# Patient Record
Sex: FEMALE | Race: WHITE | NOT HISPANIC OR LATINO | URBAN - METROPOLITAN AREA
[De-identification: names, ages, dates, MRNs, and addresses within clinical notes are randomized per-mention and may not be internally consistent; named-entity substitution may affect disease eponyms.]

---
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
## 2025-01-01 ENCOUNTER — EMERGENCY (EMERGENCY)
Facility: HOSPITAL | Age: 20
LOS: 0 days | Discharge: HOME | End: 2025-01-01
Attending: EMERGENCY MEDICINE
Payer: MEDICAID

## 2025-01-01 VITALS
WEIGHT: 181.22 LBS | HEIGHT: 68 IN | SYSTOLIC BLOOD PRESSURE: 101 MMHG | TEMPERATURE: 98 F | RESPIRATION RATE: 18 BRPM | HEART RATE: 72 BPM | OXYGEN SATURATION: 99 % | DIASTOLIC BLOOD PRESSURE: 70 MMHG

## 2025-01-01 DIAGNOSIS — O99.891 OTHER SPECIFIED DISEASES AND CONDITIONS COMPLICATING PREGNANCY: ICD-10-CM

## 2025-01-01 DIAGNOSIS — Z88.0 ALLERGY STATUS TO PENICILLIN: ICD-10-CM

## 2025-01-01 DIAGNOSIS — M41.9 SCOLIOSIS, UNSPECIFIED: ICD-10-CM

## 2025-01-01 DIAGNOSIS — O21.9 VOMITING OF PREGNANCY, UNSPECIFIED: ICD-10-CM

## 2025-01-01 DIAGNOSIS — Z3A.10 10 WEEKS GESTATION OF PREGNANCY: ICD-10-CM

## 2025-01-01 DIAGNOSIS — M54.50 LOW BACK PAIN, UNSPECIFIED: ICD-10-CM

## 2025-01-01 DIAGNOSIS — R11.2 NAUSEA WITH VOMITING, UNSPECIFIED: ICD-10-CM

## 2025-01-01 LAB
APPEARANCE UR: CLEAR — SIGNIFICANT CHANGE UP
BILIRUB UR-MCNC: NEGATIVE — SIGNIFICANT CHANGE UP
COLOR SPEC: YELLOW — SIGNIFICANT CHANGE UP
DIFF PNL FLD: NEGATIVE — SIGNIFICANT CHANGE UP
GLUCOSE UR QL: NEGATIVE MG/DL — SIGNIFICANT CHANGE UP
KETONES UR-MCNC: 15 MG/DL
LEUKOCYTE ESTERASE UR-ACNC: NEGATIVE — SIGNIFICANT CHANGE UP
NITRITE UR-MCNC: NEGATIVE — SIGNIFICANT CHANGE UP
PH UR: 7 — SIGNIFICANT CHANGE UP (ref 5–8)
PROT UR-MCNC: NEGATIVE MG/DL — SIGNIFICANT CHANGE UP
SP GR SPEC: <1.005 — LOW (ref 1–1.03)
UROBILINOGEN FLD QL: 0.2 MG/DL — SIGNIFICANT CHANGE UP (ref 0.2–1)

## 2025-01-01 PROCEDURE — 81003 URINALYSIS AUTO W/O SCOPE: CPT

## 2025-01-01 PROCEDURE — 99284 EMERGENCY DEPT VISIT MOD MDM: CPT

## 2025-01-01 PROCEDURE — 99283 EMERGENCY DEPT VISIT LOW MDM: CPT

## 2025-01-01 RX ORDER — ONDANSETRON HYDROCHLORIDE 4 MG/1
4 TABLET, FILM COATED ORAL ONCE
Refills: 0 | Status: COMPLETED | OUTPATIENT
Start: 2025-01-01 | End: 2025-01-01

## 2025-01-01 RX ORDER — ACETAMINOPHEN 500MG 500 MG/1
650 TABLET, COATED ORAL ONCE
Refills: 0 | Status: COMPLETED | OUTPATIENT
Start: 2025-01-01 | End: 2025-01-01

## 2025-01-01 RX ORDER — ONDANSETRON HYDROCHLORIDE 4 MG/1
1 TABLET, FILM COATED ORAL
Qty: 1 | Refills: 0
Start: 2025-01-01 | End: 2025-01-03

## 2025-01-01 RX ADMIN — ACETAMINOPHEN 500MG 650 MILLIGRAM(S): 500 TABLET, COATED ORAL at 03:34

## 2025-01-01 RX ADMIN — ONDANSETRON HYDROCHLORIDE 4 MILLIGRAM(S): 4 TABLET, FILM COATED ORAL at 02:03

## 2025-01-01 NOTE — ED PROVIDER NOTE - OBJECTIVE STATEMENT
19Y F 19Y F, 10 weeks pregnant, with multiple recent ED visits for abdominal pain and vomiting, presenting with 1 day h/o emesis. Patient was seen multiple times last month with similar symptoms.  She continues to complain of multiple episodes of NBNB emesis stating that she had 6 episodes today.  She is able to take small sips of water but is not tolerating any solids.  She took 20 mg of Pepcid and 25 mg of Benadryl once this morning with no improvement.  She is also complaining of lower back pain intermittently.  She was seen by her OB yesterday and had a normal ultrasound.  This morning she went to urgent care where she tested negative for flu, RSV and COVID. Denies fever, abdominal pain, cough, congestion, vaginal spotting, dysuria, hematuria, diarrhea.  Vaccines up-to-date.

## 2025-01-01 NOTE — ED ADULT TRIAGE NOTE - CHIEF COMPLAINT QUOTE
pt states she has been vomiting for 24 hours, when she eats something she throws it up about 2 hours later.  pt states she took Pepcid today w/ no relief.

## 2025-01-01 NOTE — ED PROVIDER NOTE - CARE PROVIDER_API CALL
Leti Bhagat  Pediatrics  [address] [place] [id number]  Phone: [phone]  Fax: [phone]  Follow Up Time:     Ralf Evangelista  Obstetrics and Gynecology  [address] [place] [address]  Phone: [phone]  Fax: [phone]  Follow Up Time:

## 2025-01-01 NOTE — ED PROVIDER NOTE - PHYSICAL EXAMINATION
General: Awake, alert, NAD.  HEENT: NCAT, PERRL, EOMI, conjunctiva and sclera clear, TMs non-bulging, non-erythematous, no nasal congestion, moist mucous membranes, oropharynx without erythema or exudates, supple neck, no cervical lymphadenopathy.  RESP: CTAB, no wheezes, no increased work of breathing, no tachypnea, no retractions, no nasal flaring.  CVS: RRR, S1 S2, no extra heart sounds, no murmurs, cap refill <2 sec, 2+ peripheral pulses.  ABD: (+) BS, soft, NTND.  : No costovertebral angle tenderness.  Skin: Warm, dry, well-perfused, no rashes, no lesions.   Psych: Cooperative and appropriate.

## 2025-01-01 NOTE — ED PROVIDER NOTE - PATIENT PORTAL LINK FT
You can access the FollowMyHealth Patient Portal offered by Health system by registering at the following website: [email or web address]. By joining [email or web address]’s FollowMyHealth portal, you will also be able to view your health information using other applications (apps) compatible with our system.

## 2025-01-01 NOTE — ED PROVIDER NOTE - CLINICAL SUMMARY MEDICAL DECISION MAKING FREE TEXT BOX
Labs, EKG and imaging were ordered, where indicated.  Independent interpretation of any labs, EKG & imaging that was ordered was performed by me, Dr. Olivera. Appropriate medications for patient's presenting complaints were ordered and effects were reassessed, where indicated.  Patient's records (prior hospital, ED visit, and/or nursing home note) were reviewed, if available.  Additional history was obtained from EMS, family, and/or PCP (where available).  Escalation to admission/observation was considered.  However patient feels much better and patient/parent is comfortable with discharge.  Appropriate follow-up was arranged.     nausea/vomiting in pregnancy - zofran odt given, pt tolerated po, ate full tray of food while in ED, ua neg - all results d/w pt & copies given, strict return precautions discussed, rec outpt ObGyn f/u

## 2025-01-01 NOTE — ED PROVIDER NOTE - ATTENDING CONTRIBUTION TO CARE
19-year-old  female EGA 9.5 weeks (IUP previous confirmed at 5 weeks on ultrasound) PMH scoliosis presenting with nausea and vomiting x 3 days.  Patient reports nausea and vomiting throughout pregnancy thus far, has worsened over the last few days.  Also reporting bilateral low back pain, which she states she has had similar symptoms 2 in the past from her scoliosis.  Worse with movement, relieved by rest.  Patient states that she was seen in ED recently and diagnosed with a possible UTI, was prescribed antibiotics but did not take them.  Denies any urinary symptoms.  Denies F/C, lower abdominal pain/cramping, vaginal bleeding or discharge, flank pain.    PE:  adolsecent F nad  skin warm, dry  ncat  neck supple  rrr nl s1s2 no mrg  ctab no wrr  abd soft ntnd no palpable masses no rgr  back +bl paralumbar ttp no midline ttp, no cvat  ext no cce dpi  neuro aaox3 grossly nf exam

## 2025-01-04 LAB — FMR1 GENE MUT ANL BLD/T: NORMAL

## 2025-01-09 ENCOUNTER — NON-APPOINTMENT (OUTPATIENT)
Age: 20
End: 2025-01-09

## 2025-01-14 ENCOUNTER — APPOINTMENT (OUTPATIENT)
Dept: OBGYN | Facility: CLINIC | Age: 20
End: 2025-01-14
Payer: MEDICAID

## 2025-01-14 PROCEDURE — 0502F SUBSEQUENT PRENATAL CARE: CPT

## 2025-01-28 ENCOUNTER — ASOB RESULT (OUTPATIENT)
Age: 20
End: 2025-01-28

## 2025-01-28 ENCOUNTER — APPOINTMENT (OUTPATIENT)
Dept: ANTEPARTUM | Facility: CLINIC | Age: 20
End: 2025-01-28
Payer: MEDICAID

## 2025-01-28 VITALS — DIASTOLIC BLOOD PRESSURE: 69 MMHG | HEART RATE: 87 BPM | SYSTOLIC BLOOD PRESSURE: 120 MMHG

## 2025-01-28 VITALS
SYSTOLIC BLOOD PRESSURE: 130 MMHG | WEIGHT: 191 LBS | DIASTOLIC BLOOD PRESSURE: 72 MMHG | HEART RATE: 77 BPM | HEIGHT: 65 IN | BODY MASS INDEX: 31.82 KG/M2

## 2025-01-28 PROCEDURE — 36415 COLL VENOUS BLD VENIPUNCTURE: CPT

## 2025-01-28 PROCEDURE — 76813 OB US NUCHAL MEAS 1 GEST: CPT

## 2025-02-11 ENCOUNTER — NON-APPOINTMENT (OUTPATIENT)
Age: 20
End: 2025-02-11

## 2025-02-13 ENCOUNTER — NON-APPOINTMENT (OUTPATIENT)
Age: 20
End: 2025-02-13

## 2025-02-13 ENCOUNTER — APPOINTMENT (OUTPATIENT)
Dept: OBGYN | Facility: CLINIC | Age: 20
End: 2025-02-13
Payer: MEDICAID

## 2025-02-13 PROCEDURE — 0502F SUBSEQUENT PRENATAL CARE: CPT

## 2025-02-16 LAB
AFP INTERP SERPL-IMP: NORMAL
AFP INTERP SERPL-IMP: NORMAL
AFP MOM CUT-OFF: 2.5
AFP MOM SERPL: 1.65
AFP PERCENTILE: 94
AFP SERPL-ACNC: 45.33 NG/ML
CARBAMAZEPINE?: NO
CURRENT SMOKER: NORMAL
DIABETES STATUS PATIENT: NORMAL
GA: NORMAL
GESTATIONAL AGE METHOD: NORMAL
HX OF NTD NARR: NORMAL
MULTIPLE PREGNANCY: NORMAL
NEURAL TUBE DEFECT RISK FETUS: NORMAL
NEURAL TUBE DEFECT RISK POP: NORMAL
RECOM F/U: NO
TEST PERFORMANCE INFO SPEC: NORMAL
VALPROIC ACID?: NORMAL

## 2025-02-20 ENCOUNTER — NON-APPOINTMENT (OUTPATIENT)
Age: 20
End: 2025-02-20

## 2025-02-25 ENCOUNTER — NON-APPOINTMENT (OUTPATIENT)
Age: 20
End: 2025-02-25

## 2025-03-05 ENCOUNTER — NON-APPOINTMENT (OUTPATIENT)
Age: 20
End: 2025-03-05

## 2025-03-13 ENCOUNTER — APPOINTMENT (OUTPATIENT)
Dept: OBGYN | Facility: CLINIC | Age: 20
End: 2025-03-13
Payer: MEDICAID

## 2025-03-13 PROCEDURE — 0502F SUBSEQUENT PRENATAL CARE: CPT

## 2025-03-20 ENCOUNTER — ASOB RESULT (OUTPATIENT)
Age: 20
End: 2025-03-20

## 2025-03-20 ENCOUNTER — APPOINTMENT (OUTPATIENT)
Dept: ANTEPARTUM | Facility: CLINIC | Age: 20
End: 2025-03-20
Payer: MEDICAID

## 2025-03-20 ENCOUNTER — APPOINTMENT (OUTPATIENT)
Dept: MATERNAL FETAL MEDICINE | Facility: CLINIC | Age: 20
End: 2025-03-20
Payer: MEDICAID

## 2025-03-20 VITALS
WEIGHT: 212 LBS | HEART RATE: 107 BPM | SYSTOLIC BLOOD PRESSURE: 125 MMHG | DIASTOLIC BLOOD PRESSURE: 77 MMHG | HEIGHT: 65 IN | BODY MASS INDEX: 35.32 KG/M2

## 2025-03-20 DIAGNOSIS — S80.02XA CONTUSION OF LEFT KNEE, INITIAL ENCOUNTER: ICD-10-CM

## 2025-03-20 DIAGNOSIS — S93.402D SPRAIN OF UNSPECIFIED LIGAMENT OF LEFT ANKLE, SUBSEQUENT ENCOUNTER: ICD-10-CM

## 2025-03-20 DIAGNOSIS — Z71.9 COUNSELING, UNSPECIFIED: ICD-10-CM

## 2025-03-20 DIAGNOSIS — Z98.890 OTHER SPECIFIED POSTPROCEDURAL STATES: ICD-10-CM

## 2025-03-20 DIAGNOSIS — S61.250A: ICD-10-CM

## 2025-03-20 DIAGNOSIS — Z48.00 ENCOUNTER FOR CHANGE OR REMOVAL OF NONSURGICAL WOUND DRESSING: ICD-10-CM
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
## 2025-03-20 DIAGNOSIS — M25.562 PAIN IN LEFT KNEE: ICD-10-CM

## 2025-03-20 DIAGNOSIS — R45.4 IRRITABILITY AND ANGER: ICD-10-CM

## 2025-03-20 DIAGNOSIS — M89.9 DISORDER OF BONE, UNSPECIFIED: ICD-10-CM

## 2025-03-20 DIAGNOSIS — Z71.3 DIETARY COUNSELING AND SURVEILLANCE: ICD-10-CM

## 2025-03-20 DIAGNOSIS — Q67.5 CONGENITAL DEFORMITY OF SPINE: ICD-10-CM

## 2025-03-20 DIAGNOSIS — S93.402A SPRAIN OF UNSPECIFIED LIGAMENT OF LEFT ANKLE, INITIAL ENCOUNTER: ICD-10-CM

## 2025-03-20 DIAGNOSIS — T73.0XXA STARVATION, INITIAL ENCOUNTER: ICD-10-CM

## 2025-03-20 DIAGNOSIS — M79.642 PAIN IN LEFT HAND: ICD-10-CM

## 2025-03-20 DIAGNOSIS — Z34.90 ENCOUNTER FOR SUPERVISION OF NORMAL PREGNANCY, UNSPECIFIED, UNSPECIFIED TRIMESTER: ICD-10-CM

## 2025-03-20 DIAGNOSIS — Z87.42 PERSONAL HISTORY OF OTHER DISEASES OF THE FEMALE GENITAL TRACT: ICD-10-CM

## 2025-03-20 DIAGNOSIS — M54.2 CERVICALGIA: ICD-10-CM

## 2025-03-20 DIAGNOSIS — Z87.2 OTHER SPECIFIED POSTPROCEDURAL STATES: ICD-10-CM

## 2025-03-20 DIAGNOSIS — S69.91XA UNSPECIFIED INJURY OF RIGHT WRIST, HAND AND FINGER(S), INITIAL ENCOUNTER: ICD-10-CM

## 2025-03-20 DIAGNOSIS — Z71.89 OTHER SPECIFIED COUNSELING: ICD-10-CM

## 2025-03-20 DIAGNOSIS — Z87.09 PERSONAL HISTORY OF OTHER DISEASES OF THE RESPIRATORY SYSTEM: ICD-10-CM

## 2025-03-20 DIAGNOSIS — Z91.09 OTHER ALLERGY STATUS, OTHER THAN TO DRUGS AND BIOLOGICAL SUBSTANCES: ICD-10-CM

## 2025-03-20 DIAGNOSIS — M53.3 SACROCOCCYGEAL DISORDERS, NOT ELSEWHERE CLASSIFIED: ICD-10-CM

## 2025-03-20 DIAGNOSIS — Z00.129 ENCOUNTER FOR ROUTINE CHILD HEALTH EXAMINATION W/OUT ABNORMAL FINDINGS: ICD-10-CM
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
## 2025-03-20 DIAGNOSIS — Z23 ENCOUNTER FOR IMMUNIZATION: ICD-10-CM

## 2025-03-20 DIAGNOSIS — Z3A.21 21 WEEKS GESTATION OF PREGNANCY: ICD-10-CM

## 2025-03-20 PROCEDURE — 99215 OFFICE O/P EST HI 40 MIN: CPT | Mod: 25

## 2025-03-20 PROCEDURE — 76817 TRANSVAGINAL US OBSTETRIC: CPT

## 2025-03-20 PROCEDURE — 76805 OB US >/= 14 WKS SNGL FETUS: CPT

## 2025-04-02 ENCOUNTER — ASOB RESULT (OUTPATIENT)
Age: 20
End: 2025-04-02

## 2025-04-02 ENCOUNTER — APPOINTMENT (OUTPATIENT)
Dept: ANTEPARTUM | Facility: CLINIC | Age: 20
End: 2025-04-02
Payer: MEDICAID

## 2025-04-02 VITALS
SYSTOLIC BLOOD PRESSURE: 127 MMHG | DIASTOLIC BLOOD PRESSURE: 80 MMHG | HEIGHT: 65 IN | BODY MASS INDEX: 36.32 KG/M2 | WEIGHT: 218 LBS | HEART RATE: 106 BPM

## 2025-04-02 PROCEDURE — 76816 OB US FOLLOW-UP PER FETUS: CPT

## 2025-04-03 ENCOUNTER — NON-APPOINTMENT (OUTPATIENT)
Age: 20
End: 2025-04-03

## 2025-04-03 ENCOUNTER — EMERGENCY (EMERGENCY)
Facility: HOSPITAL | Age: 20
LOS: 0 days | Discharge: HOME | End: 2025-04-03
Attending: EMERGENCY MEDICINE
Payer: MEDICAID

## 2025-04-03 ENCOUNTER — OUTPATIENT (OUTPATIENT)
Dept: INPATIENT UNIT | Facility: HOSPITAL | Age: 20
LOS: 1 days | Discharge: HOME | End: 2025-04-03
Payer: MEDICAID

## 2025-04-03 VITALS
HEART RATE: 93 BPM | TEMPERATURE: 98 F | SYSTOLIC BLOOD PRESSURE: 132 MMHG | RESPIRATION RATE: 20 BRPM | DIASTOLIC BLOOD PRESSURE: 71 MMHG

## 2025-04-03 VITALS
DIASTOLIC BLOOD PRESSURE: 74 MMHG | OXYGEN SATURATION: 98 % | RESPIRATION RATE: 18 BRPM | TEMPERATURE: 98 F | HEART RATE: 105 BPM | SYSTOLIC BLOOD PRESSURE: 116 MMHG

## 2025-04-03 VITALS — HEART RATE: 93 BPM | DIASTOLIC BLOOD PRESSURE: 71 MMHG | SYSTOLIC BLOOD PRESSURE: 132 MMHG

## 2025-04-03 DIAGNOSIS — O26.899 OTHER SPECIFIED PREGNANCY RELATED CONDITIONS, UNSPECIFIED TRIMESTER: ICD-10-CM

## 2025-04-03 LAB
APPEARANCE UR: ABNORMAL
BILIRUB UR-MCNC: NEGATIVE — SIGNIFICANT CHANGE UP
COLOR SPEC: YELLOW — SIGNIFICANT CHANGE UP
DIFF PNL FLD: NEGATIVE — SIGNIFICANT CHANGE UP
GLUCOSE UR QL: NEGATIVE MG/DL — SIGNIFICANT CHANGE UP
KETONES UR-MCNC: NEGATIVE MG/DL — SIGNIFICANT CHANGE UP
LEUKOCYTE ESTERASE UR-ACNC: ABNORMAL
NITRITE UR-MCNC: NEGATIVE — SIGNIFICANT CHANGE UP
PH UR: 6 — SIGNIFICANT CHANGE UP (ref 5–8)
PROT UR-MCNC: SIGNIFICANT CHANGE UP MG/DL
SP GR SPEC: 1.02 — SIGNIFICANT CHANGE UP (ref 1–1.03)
UROBILINOGEN FLD QL: 0.2 MG/DL — SIGNIFICANT CHANGE UP (ref 0.2–1)

## 2025-04-03 PROCEDURE — 99214 OFFICE O/P EST MOD 30 MIN: CPT

## 2025-04-03 PROCEDURE — L9996: CPT

## 2025-04-03 PROCEDURE — 87086 URINE CULTURE/COLONY COUNT: CPT

## 2025-04-03 PROCEDURE — 81001 URINALYSIS AUTO W/SCOPE: CPT

## 2025-04-03 NOTE — OB PROVIDER TRIAGE NOTE - HISTORY OF PRESENT ILLNESS
21yo , at 23wd, [place] 25, dated by 1st trimester sono presents to L&D 1 day after anatomy scan for abdominal pain. Pt reports her sonographer was rough and feels the abdomen has been tender approx 2-3 inches to the left of the umbilicus since that time. She denies diffuse or pelvic pain. Reports pain feels like a sore muscle in the region and does not radiate. She also reports decreased fetal movement since that time. She also reports a recent hx of WBCs on a urine analysis she had done at urgent care today. Pt denies lof, vb, and ctx. Denies dysuria, back pain, SOB, chest pain and n/v. GBS unknown.     Hx of well controlled asthma, not on medications at this time    Last PO: 1000  Last BM:1hr ago  Last intercourse: >2 weeks ago

## 2025-04-03 NOTE — OB RN TRIAGE NOTE - NSSDOHPHYHURT_OBGYN_A_OB
Procedure:  DILATATION AND CURETTAGE (D&C) (N/A Uterus)  LEEP CONE BIOPSY (N/A Cervix)    Relevant Problems   NEURO/PSYCH   (+) Anxiety   (+) History of COVID-19 (2/08/2022)        Physical Exam    Airway    Mallampati score: II  TM Distance: >3 FB  Neck ROM: full     Dental   upper dentures and lower dentures,     Cardiovascular  Rhythm: regular, Rate: normal,     Pulmonary  Breath sounds clear to auscultation,     Other Findings        Anesthesia Plan  ASA Score- 2     Anesthesia Type- IV sedation with anesthesia with ASA Monitors  Additional Monitors:   Airway Plan:           Plan Factors-Exercise tolerance (METS): >4 METS  Chart reviewed  Existing labs reviewed  Patient is a current smoker  Patient instructed to abstain from smoking on day of procedure  Patient did not smoke on day of surgery  Obstructive sleep apnea risk education given perioperatively  Induction- intravenous  Postoperative Plan-     Informed Consent- Anesthetic plan and risks discussed with patient 
never

## 2025-04-03 NOTE — OB PROVIDER TRIAGE NOTE - NSHPLABSRESULTS_GEN_ALL_CORE
12/26  Hep B NR  Rubella immune  Measles immune  Varicella immune  RPR NR  Hep C NR  A POS, antibody neg  HIV NR    sonos:  13w5d: NT 1.9mm, nl ovaries bl   21w0d: nl anatomy, cervical length 4.16cm, MVP 4.63cm, posterior placenta

## 2025-04-03 NOTE — ED PEDIATRIC TRIAGE NOTE - NS ED NURSE NOTE DISPO AOU
Quality 110: Preventive Care And Screening: Influenza Immunization: Influenza Immunization Ordered or Recommended, but not Administered due to system reason Quality 47: Advance Care Plan: Advance Care Planning discussed and documented; advance care plan or surrogate decision maker documented in the medical record. Quality 111:Pneumonia Vaccination Status For Older Adults: Pneumococcal Vaccination not Administered or Previously Received, Reason not Otherwise Specified Detail Level: Detailed AOU-L&D

## 2025-04-03 NOTE — OB PROVIDER TRIAGE NOTE - NSHPPHYSICALEXAM_GEN_ALL_CORE
Physical Exam  Vital Signs Last 24 Hrs  T(F): 97.9 (03 Apr 2025 18:16), Max: 98.4 (03 Apr 2025 16:41)  HR: 93 (03 Apr 2025 18:17) (93 - 105)  BP: 132/71 (03 Apr 2025 18:17) (116/74 - 132/71)  RR: 20 (03 Apr 2025 18:16) (18 - 20)    Gen: NAD, AOx3  Abdomen: soft, gravid, nontender, no palpable contractions    Pasatiempo: none  SPEC: no blood, abnormal discharge, cervical os appears closed   BSS: FHR 142bpm, gross fetal movement, MVP 6.09cm  TVUS: 4.43cm cervical length

## 2025-04-03 NOTE — OB PROVIDER TRIAGE NOTE - NSOBPROVIDERNOTE_OBGYN_ALL_OB_FT
20y  @ 23w0d, GBS unknown, with reassuring fetal and maternal status.  -Discharged Home  -Labor precautions reviewed  -PO Hydration encouraged  -Reviewed fetal kick counts  -Follow up with scheduled OB visit on 25  -Patient verbalized understanding    Discussed with Dr. Zurita and Dr. Evangelista

## 2025-04-05 LAB
CULTURE RESULTS: SIGNIFICANT CHANGE UP
SPECIMEN SOURCE: SIGNIFICANT CHANGE UP

## 2025-04-07 DIAGNOSIS — O26.892 OTHER SPECIFIED PREGNANCY RELATED CONDITIONS, SECOND TRIMESTER: ICD-10-CM

## 2025-04-07 DIAGNOSIS — O99.512 DISEASES OF THE RESPIRATORY SYSTEM COMPLICATING PREGNANCY, SECOND TRIMESTER: ICD-10-CM

## 2025-04-07 DIAGNOSIS — Z3A.23 23 WEEKS GESTATION OF PREGNANCY: ICD-10-CM

## 2025-04-07 DIAGNOSIS — J45.909 UNSPECIFIED ASTHMA, UNCOMPLICATED: ICD-10-CM

## 2025-04-07 DIAGNOSIS — O36.8120 DECREASED FETAL MOVEMENTS, SECOND TRIMESTER, NOT APPLICABLE OR UNSPECIFIED: ICD-10-CM

## 2025-04-08 ENCOUNTER — APPOINTMENT (OUTPATIENT)
Dept: OBGYN | Facility: CLINIC | Age: 20
End: 2025-04-08
Payer: MEDICAID

## 2025-04-08 PROCEDURE — 0502F SUBSEQUENT PRENATAL CARE: CPT

## 2025-05-06 ENCOUNTER — APPOINTMENT (OUTPATIENT)
Dept: OBGYN | Facility: CLINIC | Age: 20
End: 2025-05-06
Payer: MEDICAID

## 2025-05-06 PROCEDURE — 0502F SUBSEQUENT PRENATAL CARE: CPT

## 2025-05-08 ENCOUNTER — LABORATORY RESULT (OUTPATIENT)
Age: 20
End: 2025-05-08

## 2025-05-09 LAB
BASOPHILS # BLD AUTO: 0.2 K/UL
BASOPHILS NFR BLD AUTO: 1 %
EOSINOPHIL # BLD AUTO: 1.18 K/UL
EOSINOPHIL NFR BLD AUTO: 6 %
GLUCOSE 1H P 50 G GLC PO SERPL-MCNC: 142 MG/DL
HCT VFR BLD CALC: 30.4 %
HGB BLD-MCNC: 9 G/DL
LYMPHOCYTES # BLD AUTO: 3.75 K/UL
LYMPHOCYTES NFR BLD AUTO: 19 %
MAN DIFF?: NORMAL
MCHC RBC-ENTMCNC: 25 PG
MCHC RBC-ENTMCNC: 29.6 G/DL
MCV RBC AUTO: 84.4 FL
MONOCYTES # BLD AUTO: 1.38 K/UL
MONOCYTES NFR BLD AUTO: 7 %
NEUTROPHILS # BLD AUTO: 12.23 K/UL
NEUTROPHILS NFR BLD AUTO: 56 %
PLATELET # BLD AUTO: 348 K/UL
PMV BLD: 10.2 FL
RBC # BLD: 3.6 M/UL
RBC # FLD: 14.6 %
WBC # FLD AUTO: 19.72 K/UL

## 2025-05-19 ENCOUNTER — NON-APPOINTMENT (OUTPATIENT)
Age: 20
End: 2025-05-19

## 2025-05-22 PROBLEM — O24.419 GESTATIONAL DIABETES: Status: ACTIVE | Noted: 2025-05-22

## 2025-05-22 RX ORDER — BLOOD-GLUCOSE METER
W/DEVICE KIT MISCELLANEOUS
Qty: 1 | Refills: 0 | Status: ACTIVE | COMMUNITY
Start: 2025-05-22 | End: 1900-01-01

## 2025-05-22 RX ORDER — BLOOD-GLUCOSE METER
KIT MISCELLANEOUS 4 TIMES DAILY
Qty: 1 | Refills: 2 | Status: ACTIVE | COMMUNITY
Start: 2025-05-22 | End: 1900-01-01

## 2025-05-22 RX ORDER — LANCETS
EACH MISCELLANEOUS
Qty: 1 | Refills: 2 | Status: ACTIVE | COMMUNITY
Start: 2025-05-22 | End: 1900-01-01

## 2025-05-27 DIAGNOSIS — Z3A.21 21 WEEKS GESTATION OF PREGNANCY: ICD-10-CM

## 2025-05-29 ENCOUNTER — ASOB RESULT (OUTPATIENT)
Age: 20
End: 2025-05-29

## 2025-05-29 ENCOUNTER — APPOINTMENT (OUTPATIENT)
Dept: MATERNAL FETAL MEDICINE | Facility: CLINIC | Age: 20
End: 2025-05-29
Payer: MEDICAID

## 2025-05-29 ENCOUNTER — APPOINTMENT (OUTPATIENT)
Dept: ANTEPARTUM | Facility: CLINIC | Age: 20
End: 2025-05-29
Payer: MEDICAID

## 2025-05-29 ENCOUNTER — NON-APPOINTMENT (OUTPATIENT)
Age: 20
End: 2025-05-29

## 2025-05-29 VITALS
SYSTOLIC BLOOD PRESSURE: 113 MMHG | HEART RATE: 94 BPM | BODY MASS INDEX: 39.99 KG/M2 | DIASTOLIC BLOOD PRESSURE: 74 MMHG | HEIGHT: 65 IN | WEIGHT: 240 LBS

## 2025-05-29 DIAGNOSIS — Z71.9 COUNSELING, UNSPECIFIED: ICD-10-CM

## 2025-05-29 DIAGNOSIS — Z3A.22 22 WEEKS GESTATION OF PREGNANCY: ICD-10-CM

## 2025-05-29 DIAGNOSIS — Z87.09 PERSONAL HISTORY OF OTHER DISEASES OF THE RESPIRATORY SYSTEM: ICD-10-CM

## 2025-05-29 DIAGNOSIS — Z71.3 DIETARY COUNSELING AND SURVEILLANCE: ICD-10-CM

## 2025-05-29 PROBLEM — O36.60X0 EXCESSIVE FETAL GROWTH AFFECTING MANAGEMENT OF PREGNANCY, ANTEPARTUM, SINGLE OR UNSPECIFIED FETUS: Status: ACTIVE | Noted: 2025-05-29

## 2025-05-29 PROBLEM — Z3A.31 31 WEEKS GESTATION OF PREGNANCY: Status: ACTIVE | Noted: 2025-05-29

## 2025-05-29 PROBLEM — O99.019 ANEMIA AFFECTING PREGNANCY, ANTEPARTUM: Status: ACTIVE | Noted: 2025-05-27

## 2025-05-29 PROCEDURE — 99211 OFF/OP EST MAY X REQ PHY/QHP: CPT | Mod: 25

## 2025-05-29 PROCEDURE — 99215 OFFICE O/P EST HI 40 MIN: CPT | Mod: 25

## 2025-05-29 PROCEDURE — 76816 OB US FOLLOW-UP PER FETUS: CPT

## 2025-05-29 PROCEDURE — 76819 FETAL BIOPHYS PROFIL W/O NST: CPT | Mod: 59

## 2025-06-03 ENCOUNTER — EMERGENCY (EMERGENCY)
Facility: HOSPITAL | Age: 20
LOS: 0 days | Discharge: LEFT BEFORE DISCHARGE | End: 2025-06-03
Payer: MEDICAID

## 2025-06-03 ENCOUNTER — OUTPATIENT (OUTPATIENT)
Dept: INPATIENT UNIT | Facility: HOSPITAL | Age: 20
LOS: 1 days | Discharge: HOME | End: 2025-06-03
Payer: MEDICAID

## 2025-06-03 VITALS — DIASTOLIC BLOOD PRESSURE: 69 MMHG | SYSTOLIC BLOOD PRESSURE: 131 MMHG | HEART RATE: 96 BPM

## 2025-06-03 VITALS
HEART RATE: 96 BPM | DIASTOLIC BLOOD PRESSURE: 69 MMHG | TEMPERATURE: 99 F | RESPIRATION RATE: 18 BRPM | SYSTOLIC BLOOD PRESSURE: 131 MMHG

## 2025-06-03 VITALS
RESPIRATION RATE: 18 BRPM | SYSTOLIC BLOOD PRESSURE: 116 MMHG | HEART RATE: 110 BPM | OXYGEN SATURATION: 97 % | TEMPERATURE: 98 F | DIASTOLIC BLOOD PRESSURE: 71 MMHG

## 2025-06-03 DIAGNOSIS — Z3A.31 31 WEEKS GESTATION OF PREGNANCY: ICD-10-CM

## 2025-06-03 DIAGNOSIS — R10.2 PELVIC AND PERINEAL PAIN: ICD-10-CM

## 2025-06-03 DIAGNOSIS — O99.891 OTHER SPECIFIED DISEASES AND CONDITIONS COMPLICATING PREGNANCY: ICD-10-CM

## 2025-06-03 DIAGNOSIS — O26.899 OTHER SPECIFIED PREGNANCY RELATED CONDITIONS, UNSPECIFIED TRIMESTER: ICD-10-CM

## 2025-06-03 DIAGNOSIS — Z53.21 PROCEDURE AND TREATMENT NOT CARRIED OUT DUE TO PATIENT LEAVING PRIOR TO BEING SEEN BY HEALTH CARE PROVIDER: ICD-10-CM

## 2025-06-03 LAB — GLUCOSE BLDC GLUCOMTR-MCNC: 110 MG/DL — HIGH (ref 70–99)

## 2025-06-03 PROCEDURE — 99223 1ST HOSP IP/OBS HIGH 75: CPT | Mod: 25

## 2025-06-03 PROCEDURE — 59025 FETAL NON-STRESS TEST: CPT | Mod: 26

## 2025-06-03 PROCEDURE — 59025 FETAL NON-STRESS TEST: CPT

## 2025-06-03 PROCEDURE — L9996: CPT

## 2025-06-03 PROCEDURE — 82962 GLUCOSE BLOOD TEST: CPT

## 2025-06-03 PROCEDURE — 99214 OFFICE O/P EST MOD 30 MIN: CPT

## 2025-06-03 NOTE — OB PROVIDER TRIAGE NOTE - NSHPPHYSICALEXAM_GEN_ALL_CORE
T(C): 37.3 (06-03-25 @ 16:46), Max: 37.3 (06-03-25 @ 16:46)  HR: 96 (06-03-25 @ 16:50) (96 - 110)  BP: 131/69 (06-03-25 @ 16:50) (116/71 - 131/69)  RR: 18 (06-03-25 @ 16:46) (18 - 18)  SpO2: 97% (06-03-25 @ 16:21) (97% - 97%)    Abd: gravid, soft, NT  VE: 0/0/-3  Ctx: none noted  FHR: 135 moderate variability, Reactive NST T(C): 37.3 (06-03-25 @ 16:46), Max: 37.3 (06-03-25 @ 16:46)  HR: 96 (06-03-25 @ 16:50) (96 - 110)  BP: 131/69 (06-03-25 @ 16:50) (116/71 - 131/69)  RR: 18 (06-03-25 @ 16:46) (18 - 18)  SpO2: 97% (06-03-25 @ 16:21) (97% - 97%)    Abd: gravid, soft, NT  Heart: Nl S1S2 RRR  Lungs: CTAB neg wheeze  VE: 0/0/-3  Ctx: none noted  FHR: 135 moderate variability, Reactive NST

## 2025-06-03 NOTE — OB PROVIDER TRIAGE NOTE - ADDITIONAL INSTRUCTIONS
If you experience more than 4 contractions in 20 minutes, Leaking of fluid or vaginal bleeding, call your doctor/ return to the hospital  Increase your fluid intake  You may take Tylenol as directed for pain  Followup with scheduled appointment

## 2025-06-03 NOTE — OB PROVIDER TRIAGE NOTE - HISTORY OF PRESENT ILLNESS
Pt is a 20y.o.  @ 31w5d presents c/o pelvic pain and low back pain after "jogging" a half a block. Pt reports pain is worse with movement and relieved when lying down. Pt denies abdominal pain or tightening, denies VB, LOF and reports good FM. Pt denies dysuria, frequency, denies recent intercourse.     Pregnancy c/b:   # GDM A1- diet control- fasting ~110, PP <120  - baby suspected LGA and Poly  #h/o Asthma  no medications, reports they      Pt is a 20y.o.  @ 31w5d presents c/o pelvic pain and low back pain after "jogging" a half a block. Pt reports pain is worse with movement and relieved when lying down. Pt denies abdominal pain or tightening, denies VB, LOF and reports good FM. Pt denies dysuria, frequency, denies recent intercourse.     Pregnancy c/b:   # GDM A1- diet control- fasting ~110, PP <120  - baby suspected LGA and Poly  #h/o Asthma  no medications, reports they - pt lives w/ 8 cats and reports she is allergic  pt does not follow with pulmonologist

## 2025-06-03 NOTE — OB RN TRIAGE NOTE - FALL HARM RISK - RISK INTERVENTIONS

## 2025-06-03 NOTE — OB PROVIDER TRIAGE NOTE - NS ATTEND AMEND GEN_ALL_CORE FT
31 weeks with musculoskeletal pain  cervix long, closed, no sign of  labor  fetal testing reactive  VS WNL  precautions given

## 2025-06-03 NOTE — OB PROVIDER TRIAGE NOTE - NSHPLABSRESULTS_GEN_ALL_CORE
, /206/184/163    12/26/25  A positive, antibody negative  Rubella immune  Measles immune  Varicella immune  HepBSag nonreactive  RPR nonreactive  HCV Nonreactive  HIV nonreactive    Sono @ 31wks Vtx, post placenta, EFW: 2210g, [id number] (98%), AC >99%, pyelectasis in right kidney, 1.1cm, BPP 8/8, Poly MVP 10.6  Sono @ 22.6wks variable, post placenta, fetal anatomy normal   Sono @ 21wks S=d, post placenta, nl anatomy but limited due to fetal position  Sono @ 13w5d NT WNL

## 2025-06-03 NOTE — OB PROVIDER TRIAGE NOTE - NSOBPROVIDERNOTE_OBGYN_ALL_OB_FT
20y.o.  @ 31w5d with yan musculoskeletal pain, GDM A1, h/o Asthma  - Discharge to home   - Increase PO hydration  - Tylenol PRN  - PTL precautions reviewed  - Followup friday at [facility]  - Dr. Jamin toth

## 2025-06-05 ENCOUNTER — ASOB RESULT (OUTPATIENT)
Age: 20
End: 2025-06-05

## 2025-06-05 ENCOUNTER — APPOINTMENT (OUTPATIENT)
Dept: MATERNAL FETAL MEDICINE | Facility: CLINIC | Age: 20
End: 2025-06-05
Payer: MEDICAID

## 2025-06-05 ENCOUNTER — APPOINTMENT (OUTPATIENT)
Dept: ANTEPARTUM | Facility: CLINIC | Age: 20
End: 2025-06-05
Payer: MEDICAID

## 2025-06-05 VITALS
HEART RATE: 95 BPM | DIASTOLIC BLOOD PRESSURE: 78 MMHG | BODY MASS INDEX: 39.49 KG/M2 | HEIGHT: 65 IN | WEIGHT: 237 LBS | SYSTOLIC BLOOD PRESSURE: 129 MMHG

## 2025-06-05 DIAGNOSIS — M54.50 LOW BACK PAIN, UNSPECIFIED: ICD-10-CM

## 2025-06-05 DIAGNOSIS — Z3A.32 32 WEEKS GESTATION OF PREGNANCY: ICD-10-CM

## 2025-06-05 DIAGNOSIS — Z3A.31 31 WEEKS GESTATION OF PREGNANCY: ICD-10-CM

## 2025-06-05 DIAGNOSIS — R10.2 PELVIC AND PERINEAL PAIN: ICD-10-CM

## 2025-06-05 DIAGNOSIS — O99.891 OTHER SPECIFIED DISEASES AND CONDITIONS COMPLICATING PREGNANCY: ICD-10-CM

## 2025-06-05 DIAGNOSIS — O26.893 OTHER SPECIFIED PREGNANCY RELATED CONDITIONS, THIRD TRIMESTER: ICD-10-CM

## 2025-06-05 DIAGNOSIS — O36.60X0 MATERNAL CARE FOR EXCESSIVE FETAL GROWTH, UNSPECIFIED TRIMESTER, NOT APPLICABLE OR UNSPECIFIED: ICD-10-CM

## 2025-06-05 DIAGNOSIS — O24.410 GESTATIONAL DIABETES MELLITUS IN PREGNANCY, DIET CONTROLLED: ICD-10-CM

## 2025-06-05 DIAGNOSIS — O24.419 GESTATIONAL DIABETES MELLITUS IN PREGNANCY, UNSPECIFIED CONTROL: ICD-10-CM

## 2025-06-05 DIAGNOSIS — O99.019 ANEMIA COMPLICATING PREGNANCY, UNSPECIFIED TRIMESTER: ICD-10-CM

## 2025-06-05 DIAGNOSIS — J45.909 UNSPECIFIED ASTHMA, UNCOMPLICATED: ICD-10-CM

## 2025-06-05 DIAGNOSIS — O99.513 DISEASES OF THE RESPIRATORY SYSTEM COMPLICATING PREGNANCY, THIRD TRIMESTER: ICD-10-CM

## 2025-06-05 PROCEDURE — 76818 FETAL BIOPHYS PROFILE W/NST: CPT

## 2025-06-05 PROCEDURE — ZZZZZ: CPT

## 2025-06-05 PROCEDURE — 99214 OFFICE O/P EST MOD 30 MIN: CPT | Mod: 25

## 2025-06-05 RX ORDER — DEXTROSE 3.75 G
4 TABLET,CHEWABLE ORAL
Qty: 4 | Refills: 1 | Status: ACTIVE | COMMUNITY
Start: 2025-06-05 | End: 1900-01-01

## 2025-06-05 RX ORDER — GLUCAGON 1 MG
1 KIT INJECTION
Qty: 1 | Refills: 1 | Status: ACTIVE | COMMUNITY
Start: 2025-06-05 | End: 1900-01-01

## 2025-06-05 RX ORDER — INSULIN HUMAN 100 [IU]/ML
100 INJECTION, SUSPENSION SUBCUTANEOUS
Qty: 5 | Refills: 0 | Status: ACTIVE | COMMUNITY
Start: 2025-06-05 | End: 1900-01-01

## 2025-06-05 RX ORDER — PEN NEEDLE, DIABETIC 29 G X1/2"
31G X 5 MM NEEDLE, DISPOSABLE MISCELLANEOUS
Qty: 120 | Refills: 5 | Status: ACTIVE | COMMUNITY
Start: 2025-06-05 | End: 1900-01-01

## 2025-06-09 ENCOUNTER — NON-APPOINTMENT (OUTPATIENT)
Age: 20
End: 2025-06-09

## 2025-06-09 ENCOUNTER — OUTPATIENT (OUTPATIENT)
Dept: INPATIENT UNIT | Facility: HOSPITAL | Age: 20
LOS: 1 days | Discharge: HOME | End: 2025-06-09
Payer: MEDICAID

## 2025-06-09 ENCOUNTER — APPOINTMENT (OUTPATIENT)
Dept: OBGYN | Facility: CLINIC | Age: 20
End: 2025-06-09

## 2025-06-09 VITALS — SYSTOLIC BLOOD PRESSURE: 119 MMHG | DIASTOLIC BLOOD PRESSURE: 60 MMHG | HEART RATE: 109 BPM

## 2025-06-09 VITALS — DIASTOLIC BLOOD PRESSURE: 58 MMHG | SYSTOLIC BLOOD PRESSURE: 122 MMHG | HEART RATE: 110 BPM

## 2025-06-09 DIAGNOSIS — O26.899 OTHER SPECIFIED PREGNANCY RELATED CONDITIONS, UNSPECIFIED TRIMESTER: ICD-10-CM

## 2025-06-09 LAB
FLUAV AG NPH QL: SIGNIFICANT CHANGE UP
FLUBV AG NPH QL: SIGNIFICANT CHANGE UP
GLUCOSE BLDC GLUCOMTR-MCNC: 97 MG/DL — SIGNIFICANT CHANGE UP (ref 70–99)
RSV RNA NPH QL NAA+NON-PROBE: SIGNIFICANT CHANGE UP
SARS-COV-2 RNA SPEC QL NAA+PROBE: SIGNIFICANT CHANGE UP
SOURCE RESPIRATORY: SIGNIFICANT CHANGE UP

## 2025-06-09 PROCEDURE — 0241U: CPT

## 2025-06-09 PROCEDURE — 82962 GLUCOSE BLOOD TEST: CPT

## 2025-06-09 PROCEDURE — 99214 OFFICE O/P EST MOD 30 MIN: CPT

## 2025-06-09 PROCEDURE — 59025 FETAL NON-STRESS TEST: CPT

## 2025-06-09 RX ORDER — SODIUM CHLORIDE 9 G/1000ML
500 INJECTION, SOLUTION INTRAVENOUS ONCE
Refills: 0 | Status: COMPLETED | OUTPATIENT
Start: 2025-06-09 | End: 2025-06-09

## 2025-06-09 RX ADMIN — SODIUM CHLORIDE 1000 MILLILITER(S): 9 INJECTION, SOLUTION INTRAVENOUS at 16:33

## 2025-06-09 NOTE — OB RN TRIAGE NOTE - FALL HARM RISK - UNIVERSAL INTERVENTIONS
Bed in lowest position, wheels locked, appropriate side rails in place/Call bell, personal items and telephone in reach/Instruct patient to call for assistance before getting out of bed or chair/Non-slip footwear when patient is out of bed/[place] to call system/Physically safe environment - no spills, clutter or unnecessary equipment/Purposeful Proactive Rounding/Room/bathroom lighting operational, light cord in reach

## 2025-06-09 NOTE — OB PROVIDER TRIAGE NOTE - NSHPPHYSICALEXAM_GEN_ALL_CORE
T(C): 36.7 (06-09-25 @ 14:00), Max: 36.7 (06-09-25 @ 14:00)  HR: 109 (06-09-25 @ 14:00) (109 - 109)  BP: 119/60 (06-09-25 @ 14:00) (119/60 - 119/60)  RR: 16 (06-09-25 @ 14:00) (16 - 16)  SpO2: 97%    General: alert, oriented, no acute distress  Abd: soft, gravid, nontender, S=D  Extremities: no sign of DVT on exam  Lungs: clear to auscultation bilaterally; speaking and breathing normally  Heart: regular rate/rhythm    EFM: 155 bpm, moderate variability, +accels (Reactive NST)  TOCO: no ctx T(C): 36.7 (06-09-25 @ 14:00), Max: 36.7 (06-09-25 @ 14:00)  HR: 109 (06-09-25 @ 14:00) (109 - 109)  BP: 119/60 (06-09-25 @ 14:00) (119/60 - 119/60)  RR: 16 (06-09-25 @ 14:00) (16 - 16)  SpO2: 97%    General: alert, oriented, no acute distress  Abd: soft, gravid, nontender, S=D  Extremities: no sign of DVT on exam  Lungs: clear to auscultation bilaterally; speaking and breathing normally  Heart: regular rate/rhythm    EFM: 155 bpm, moderate variability, +accels (Reactive NST)  TOCO: no ctx    bedside sono: SIUP, posterior placenta, BPP 8/8 (with AF--DVP 5.5cm)

## 2025-06-09 NOTE — OB PROVIDER TRIAGE NOTE - NSHPLABSRESULTS_GEN_ALL_CORE
5/16/25   /206/184/163    12/26/25  A positive, antibody negative  Rubella immune  Measles immune  Varicella immune  HepBSag nonreactive  RPR nonreactive  HCV Nonreactive  HIV nonreactive    Sono @ 32w0d: vertex, placenta posterior, SHONNA 19.28 (DVP 5.4), BPP 10/10, THE RIGHT RENAL PELVIS MEASURES 14.3 MM WITH CALYCEAL DILATION (UTD 2-3) THE LEFT FETAL KIDNEY APPEARS NORMAL  Sono @ 31wks Vtx, post placenta, EFW: 2210g, [id number] (98%), AC >99%, pyelectasis in right kidney, 1.1cm, BPP 8/8, Poly MVP 10.6  Sono @ 22.6wks variable, post placenta, fetal anatomy normal   Sono @ 21wks S=d, post placenta, nl anatomy but limited due to fetal position  Sono @ 13w5d NT WNL POC glucose today in triage: 97    5/16/25   /206/184/163    12/26/25  A positive, antibody negative  Rubella immune  Measles immune  Varicella immune  HepBSag nonreactive  RPR nonreactive  HCV Nonreactive  HIV nonreactive    Sono @ 32w0d: vertex, placenta posterior, SHONNA 19.28 (DVP 5.4), BPP 10/10, THE RIGHT RENAL PELVIS MEASURES 14.3 MM WITH CALYCEAL DILATION (UTD 2-3) THE LEFT FETAL KIDNEY APPEARS NORMAL  Sono @ 31wks Vtx, post placenta, EFW: 2210g, [id number] (98%), AC >99%, pyelectasis in right kidney, 1.1cm, BPP 8/8, Poly MVP 10.6  Sono @ 22.6wks variable, post placenta, fetal anatomy normal   Sono @ 21wks S=d, post placenta, nl anatomy but limited due to fetal position  Sono @ 13w5d NT WNL

## 2025-06-09 NOTE — OB PROVIDER TRIAGE NOTE - HISTORY OF PRESENT ILLNESS
21yo  @ 32w4d ([place] by [place]) presents for flu-like symptoms. Pt reports that she has felt nausea and vomiting since yesterday afternoon around 2:00pm, but not loss of appetite Last ate pizza around 10:00pm last night and vomited, then crackers a few minutes ago, which she tolerated. Last drank gatorade today (drinking right now in triage) and tolerating. Also reports a productive cough for the past week, which is resolving now, along with nasal congestion and joint aches in her knees and back. Denies fevers, denies chills. Denies vaginal bleeding, leakage of fluid, and contractions. Endorses active fetal movements.     Complications with this pregnancy:  # GDMA2: was prescribed insulin but has not started yet due to not feeling well  - baby suspected LGA and Poly, with R renal pyelectasis  #h/o Asthma  - no medications, reports they  and has not needed them in several years; pt lives w/ 8 cats and reports she is allergic; pt does not follow with pulmonologist; states her last asthma attack was "many years ago"; denies any history of hospitalization/intubation 19yo  @ 32w4d ([place] by [place]) presents for flu-like symptoms. Pt reports that she has felt nausea and vomiting since yesterday afternoon around 2:00pm, but not loss of appetite. Last ate pizza around 10:00pm last night and vomited, then crackers a few minutes ago, which she tolerated. Last drank gatorade today (drinking right now in triage) and tolerating. Also reports a productive cough for the past week, which is resolving now, along with nasal congestion and joint aches in her knees and back. Denies fevers, denies chills. Denies vaginal bleeding, leakage of fluid, and contractions. Endorses active fetal movements.     Complications with this pregnancy:  # GDMA2: was prescribed insulin (NPH 10u at bedtime) but has not started yet due to not feeling well  - baby suspected LGA and Poly, with R renal pyelectasis  #h/o Asthma  - no medications, reports they  and has not needed them in several years; pt lives w/ 8 cats and reports she is allergic; pt does not follow with pulmonologist; states her last asthma attack was "many years ago"; denies any history of hospitalization/intubation

## 2025-06-09 NOTE — OB PROVIDER TRIAGE NOTE - NSOBPROVIDERNOTE_OBGYN_ALL_OB_FT
20y  @ 32.4weeks, h.o asthma, GDMA2, with flu-like symptoms.  - Flu/RSV/COVID panel collected; spoke with lab staff for STAT processing.   - LR fluid bolus.  - pt tolerating PO intake (crackers and gatorade) in triage now, without vomiting.   - fetal assessment reassuring with BPP 10/10.   - continue to observe and follow-up results.  Dr. Evangelista aware. 20y  @ 32.4weeks, h/o asthma, GDMA2, with flu-like symptoms.  - Flu/RSV/COVID panel collected; spoke with lab staff for STAT processing.   - LR fluid bolus.  - pt tolerating PO intake (crackers and gatorade) in triage now, without vomiting.   - fetal assessment reassuring with BPP 10/10.   - continue to observe and follow-up results; endorsed to OB evening team.   Dr. Evangelista and Dr. Jackson PGY-3 aware. 20y  @ 32.4weeks, h/o asthma, GDMA2, with flu-like symptoms.  - Flu/RSV/COVID panel collected; spoke with lab staff for STAT processing.   - LR fluid bolus.  - pt tolerating PO intake (crackers and gatorade) in triage now, without vomiting.   - fetal assessment reassuring with BPP 10/10.   - continue to observe and follow-up results; endorsed to OB evening team.   Dr. Evangelista and Dr. Jackson PGY-3 aware.    Pt reassessed after fluids. Feeling much improved, now tolerating PO intake, RVP returned normal.   -Reassuring maternal and fetal status at this time  -Pt instructed to restart her insulin at home once consistently tolerating PO intake  -Discharge home with close follow up with PMD and MFM

## 2025-06-10 ENCOUNTER — NON-APPOINTMENT (OUTPATIENT)
Age: 20
End: 2025-06-10

## 2025-06-11 DIAGNOSIS — R09.81 NASAL CONGESTION: ICD-10-CM

## 2025-06-11 DIAGNOSIS — M25.561 PAIN IN RIGHT KNEE: ICD-10-CM

## 2025-06-11 DIAGNOSIS — O26.893 OTHER SPECIFIED PREGNANCY RELATED CONDITIONS, THIRD TRIMESTER: ICD-10-CM

## 2025-06-11 DIAGNOSIS — O21.2 LATE VOMITING OF PREGNANCY: ICD-10-CM

## 2025-06-11 DIAGNOSIS — R05.9 COUGH, UNSPECIFIED: ICD-10-CM

## 2025-06-11 DIAGNOSIS — Z3A.32 32 WEEKS GESTATION OF PREGNANCY: ICD-10-CM

## 2025-06-11 DIAGNOSIS — O24.414 GESTATIONAL DIABETES MELLITUS IN PREGNANCY, INSULIN CONTROLLED: ICD-10-CM

## 2025-06-11 DIAGNOSIS — Z20.822 CONTACT WITH AND (SUSPECTED) EXPOSURE TO COVID-19: ICD-10-CM

## 2025-06-11 DIAGNOSIS — M25.562 PAIN IN LEFT KNEE: ICD-10-CM

## 2025-06-11 DIAGNOSIS — O99.891 OTHER SPECIFIED DISEASES AND CONDITIONS COMPLICATING PREGNANCY: ICD-10-CM

## 2025-06-11 DIAGNOSIS — O35.8XX0 MATERNAL CARE FOR OTHER (SUSPECTED) FETAL ABNORMALITY AND DAMAGE, NOT APPLICABLE OR UNSPECIFIED: ICD-10-CM

## 2025-06-11 DIAGNOSIS — O40.3XX0 POLYHYDRAMNIOS, THIRD TRIMESTER, NOT APPLICABLE OR UNSPECIFIED: ICD-10-CM

## 2025-06-13 ENCOUNTER — APPOINTMENT (OUTPATIENT)
Dept: ANTEPARTUM | Facility: CLINIC | Age: 20
End: 2025-06-13
Payer: MEDICAID

## 2025-06-13 ENCOUNTER — NON-APPOINTMENT (OUTPATIENT)
Age: 20
End: 2025-06-13

## 2025-06-13 ENCOUNTER — ASOB RESULT (OUTPATIENT)
Age: 20
End: 2025-06-13

## 2025-06-13 ENCOUNTER — APPOINTMENT (OUTPATIENT)
Dept: MATERNAL FETAL MEDICINE | Facility: CLINIC | Age: 20
End: 2025-06-13
Payer: MEDICAID

## 2025-06-13 VITALS
BODY MASS INDEX: 38.32 KG/M2 | SYSTOLIC BLOOD PRESSURE: 105 MMHG | WEIGHT: 230 LBS | HEIGHT: 65 IN | DIASTOLIC BLOOD PRESSURE: 68 MMHG | HEART RATE: 107 BPM

## 2025-06-13 VITALS — OXYGEN SATURATION: 96 % | HEART RATE: 90 BPM

## 2025-06-13 LAB
BILIRUB UR QL STRIP: NORMAL
CLARITY UR: NORMAL
COLLECTION METHOD: NORMAL
GLUCOSE BLDC GLUCOMTR-MCNC: 99
GLUCOSE UR-MCNC: NORMAL
HCG UR QL: 0.2 EU/DL
HGB UR QL STRIP.AUTO: NORMAL
KETONES UR-MCNC: NORMAL
LEUKOCYTE ESTERASE UR QL STRIP: NORMAL
NITRITE UR QL STRIP: NORMAL
PH UR STRIP: 6.5
PROT UR STRIP-MCNC: NORMAL
SP GR UR STRIP: 1.02

## 2025-06-13 PROCEDURE — 99214 OFFICE O/P EST MOD 30 MIN: CPT | Mod: 25

## 2025-06-13 PROCEDURE — 76818 FETAL BIOPHYS PROFILE W/NST: CPT

## 2025-06-13 PROCEDURE — 82962 GLUCOSE BLOOD TEST: CPT

## 2025-06-13 RX ORDER — METFORMIN HYDROCHLORIDE 500 MG/1
500 TABLET, COATED ORAL
Qty: 90 | Refills: 2 | Status: ACTIVE | COMMUNITY
Start: 2025-06-13 | End: 1900-01-01

## 2025-06-16 LAB
ALBUMIN SERPL ELPH-MCNC: 3.9 G/DL
ALP BLD-CCNC: 192 U/L
ALT SERPL-CCNC: 24 U/L
ANION GAP SERPL CALC-SCNC: 12 MMOL/L
AST SERPL-CCNC: 13 U/L
BILIRUB SERPL-MCNC: 0.2 MG/DL
BUN SERPL-MCNC: 9 MG/DL
CALCIUM SERPL-MCNC: 9.4 MG/DL
CHLORIDE SERPL-SCNC: 100 MMOL/L
CO2 SERPL-SCNC: 22 MMOL/L
CREAT SERPL-MCNC: 0.5 MG/DL
EGFRCR SERPLBLD CKD-EPI 2021: 138 ML/MIN/1.73M2
GLUCOSE SERPL-MCNC: 87 MG/DL
POTASSIUM SERPL-SCNC: 4.5 MMOL/L
PROT SERPL-MCNC: 6.7 G/DL
SODIUM SERPL-SCNC: 134 MMOL/L

## 2025-06-17 ENCOUNTER — APPOINTMENT (OUTPATIENT)
Dept: ANTEPARTUM | Facility: CLINIC | Age: 20
End: 2025-06-17
Payer: MEDICAID

## 2025-06-17 ENCOUNTER — ASOB RESULT (OUTPATIENT)
Age: 20
End: 2025-06-17

## 2025-06-17 ENCOUNTER — APPOINTMENT (OUTPATIENT)
Dept: MATERNAL FETAL MEDICINE | Facility: CLINIC | Age: 20
End: 2025-06-17
Payer: MEDICAID

## 2025-06-17 VITALS
DIASTOLIC BLOOD PRESSURE: 64 MMHG | WEIGHT: 231 LBS | SYSTOLIC BLOOD PRESSURE: 105 MMHG | HEART RATE: 98 BPM | HEIGHT: 65 IN | BODY MASS INDEX: 38.49 KG/M2

## 2025-06-17 VITALS — WEIGHT: 231 LBS | BODY MASS INDEX: 38.49 KG/M2 | HEIGHT: 65 IN

## 2025-06-17 PROBLEM — Z3A.32 32 WEEKS GESTATION OF PREGNANCY: Status: RESOLVED | Noted: 2025-06-05 | Resolved: 2025-06-17

## 2025-06-17 PROCEDURE — 99215 OFFICE O/P EST HI 40 MIN: CPT | Mod: 25

## 2025-06-17 PROCEDURE — 76818 FETAL BIOPHYS PROFILE W/NST: CPT

## 2025-06-17 PROCEDURE — ZZZZZ: CPT

## 2025-06-19 ENCOUNTER — APPOINTMENT (OUTPATIENT)
Dept: OBGYN | Facility: CLINIC | Age: 20
End: 2025-06-19
Payer: MEDICAID

## 2025-06-19 PROCEDURE — 59425 ANTEPARTUM CARE ONLY: CPT

## 2025-06-19 PROCEDURE — 0502F SUBSEQUENT PRENATAL CARE: CPT

## 2025-06-20 ENCOUNTER — ASOB RESULT (OUTPATIENT)
Age: 20
End: 2025-06-20

## 2025-06-20 ENCOUNTER — NON-APPOINTMENT (OUTPATIENT)
Age: 20
End: 2025-06-20

## 2025-06-20 ENCOUNTER — APPOINTMENT (OUTPATIENT)
Dept: MATERNAL FETAL MEDICINE | Facility: CLINIC | Age: 20
End: 2025-06-20
Payer: MEDICAID

## 2025-06-20 ENCOUNTER — APPOINTMENT (OUTPATIENT)
Dept: ANTEPARTUM | Facility: CLINIC | Age: 20
End: 2025-06-20
Payer: MEDICAID

## 2025-06-20 VITALS
SYSTOLIC BLOOD PRESSURE: 122 MMHG | WEIGHT: 231 LBS | HEART RATE: 109 BPM | HEIGHT: 65 IN | BODY MASS INDEX: 38.49 KG/M2 | DIASTOLIC BLOOD PRESSURE: 77 MMHG

## 2025-06-20 PROCEDURE — 76818 FETAL BIOPHYS PROFILE W/NST: CPT

## 2025-06-20 PROCEDURE — 99214 OFFICE O/P EST MOD 30 MIN: CPT | Mod: 25

## 2025-06-20 PROCEDURE — ZZZZZ: CPT

## 2025-06-27 ENCOUNTER — APPOINTMENT (OUTPATIENT)
Dept: MATERNAL FETAL MEDICINE | Facility: CLINIC | Age: 20
End: 2025-06-27

## 2025-06-27 ENCOUNTER — ASOB RESULT (OUTPATIENT)
Age: 20
End: 2025-06-27

## 2025-06-27 ENCOUNTER — APPOINTMENT (OUTPATIENT)
Dept: ANTEPARTUM | Facility: CLINIC | Age: 20
End: 2025-06-27
Payer: MEDICAID

## 2025-06-27 VITALS
HEART RATE: 90 BPM | SYSTOLIC BLOOD PRESSURE: 121 MMHG | BODY MASS INDEX: 38.99 KG/M2 | WEIGHT: 234 LBS | HEIGHT: 65 IN | DIASTOLIC BLOOD PRESSURE: 77 MMHG

## 2025-06-27 PROCEDURE — 99214 OFFICE O/P EST MOD 30 MIN: CPT | Mod: 25

## 2025-06-27 PROCEDURE — 76818 FETAL BIOPHYS PROFILE W/NST: CPT

## 2025-06-27 PROCEDURE — ZZZZZ: CPT

## 2025-06-30 ENCOUNTER — APPOINTMENT (OUTPATIENT)
Dept: ANTEPARTUM | Facility: CLINIC | Age: 20
End: 2025-06-30
Payer: MEDICAID

## 2025-06-30 ENCOUNTER — OUTPATIENT (OUTPATIENT)
Dept: INPATIENT UNIT | Facility: HOSPITAL | Age: 20
LOS: 1 days | Discharge: HOME | End: 2025-06-30
Payer: MEDICAID

## 2025-06-30 ENCOUNTER — APPOINTMENT (OUTPATIENT)
Dept: OBGYN | Facility: CLINIC | Age: 20
End: 2025-06-30

## 2025-06-30 ENCOUNTER — ASOB RESULT (OUTPATIENT)
Age: 20
End: 2025-06-30

## 2025-06-30 VITALS — SYSTOLIC BLOOD PRESSURE: 129 MMHG | HEART RATE: 101 BPM | DIASTOLIC BLOOD PRESSURE: 64 MMHG

## 2025-06-30 VITALS
HEIGHT: 65 IN | DIASTOLIC BLOOD PRESSURE: 80 MMHG | WEIGHT: 234 LBS | SYSTOLIC BLOOD PRESSURE: 124 MMHG | HEART RATE: 112 BPM | BODY MASS INDEX: 38.99 KG/M2

## 2025-06-30 VITALS — TEMPERATURE: 98 F | DIASTOLIC BLOOD PRESSURE: 66 MMHG | SYSTOLIC BLOOD PRESSURE: 131 MMHG | HEART RATE: 105 BPM

## 2025-06-30 DIAGNOSIS — O26.899 OTHER SPECIFIED PREGNANCY RELATED CONDITIONS, UNSPECIFIED TRIMESTER: ICD-10-CM

## 2025-06-30 LAB — GLUCOSE BLDC GLUCOMTR-MCNC: 98 MG/DL — SIGNIFICANT CHANGE UP (ref 70–99)

## 2025-06-30 PROCEDURE — ZZZZZ: CPT

## 2025-06-30 PROCEDURE — 76818 FETAL BIOPHYS PROFILE W/NST: CPT | Mod: 59

## 2025-06-30 PROCEDURE — 99214 OFFICE O/P EST MOD 30 MIN: CPT

## 2025-06-30 PROCEDURE — 59025 FETAL NON-STRESS TEST: CPT

## 2025-06-30 PROCEDURE — 82962 GLUCOSE BLOOD TEST: CPT

## 2025-06-30 PROCEDURE — 76816 OB US FOLLOW-UP PER FETUS: CPT

## 2025-06-30 NOTE — OB PROVIDER TRIAGE NOTE - NSICDXNOFAMILYHX_GEN_ALL_CORE
Patient stepped on a fork at school that went through his shoe piercing his R great toe, was bleeding initially, has subsided.      Triage Assessment     Row Name 03/01/23 5624       Triage Assessment (Pediatric)    Airway WDL WDL       Respiratory WDL    Respiratory WDL WDL       Cardiac WDL    Cardiac WDL WDL              
<-- Click to add NO pertinent Family History

## 2025-06-30 NOTE — OB PROVIDER TRIAGE NOTE - HISTORY OF PRESENT ILLNESS
Pt is a 20y.o.  @ 35w4d dated by 1st trimester sono presents from [facility] for prolonged fetal monitoring for fetal tachycardia. FHR baseline was 165 with accelerations to 180's. BPP today was 8/8. Pt reports mild abdominal pain that began here in L&D, constant. Pt denies LOF, VB and reports good FM     # GDM A2  - Pt prescribed metformin 1000mg PO BID but is not consistent with taking medications   ~110, PP <120  - baby suspected LGA- EFW today 3360g, 7lb7oz (97%), AC >99%  - SHONNA 21, MVP 5.6  # baby w/ right renal pyelectasis 16mm, calyceal dilation vs renal cyst  #h/o Asthma  no medications, reports they - pt lives w/ 8 cats and reports she is allergic  pt does not follow with pulmonologist

## 2025-06-30 NOTE — OB RN TRIAGE NOTE - FALL HARM RISK - UNIVERSAL INTERVENTIONS
Bed in lowest position, wheels locked, appropriate side rails in place/Call bell, personal items and telephone in reach/Instruct patient to call for assistance before getting out of bed or chair/Non-slip footwear when patient is out of bed/[place] to call system/Physically safe environment - no spills, clutter or unnecessary equipment/Purposeful Proactive Rounding/Room/bathroom lighting operational, light cord in reach

## 2025-06-30 NOTE — OB PROVIDER TRIAGE NOTE - NSHPPHYSICALEXAM_GEN_ALL_CORE
T(C): 36.8 (06-30-25 @ 17:54), Max: 36.8 (06-30-25 @ 17:54)  HR: --  BP: 131/66 (06-30-25 @ 17:54) (131/66 - 131/66)  RR: --      Abd: gravid, soft, NT, mild palpable ctx  VE: 0/0/-3  CTx: irregular ctx noted  FHR: 155 moderate variability, Reactive NST

## 2025-06-30 NOTE — OB PROVIDER TRIAGE NOTE - NS_VISITREASON1_OBGYN_ALL_OB
Pt down to IR with IR tech. Wound vac on bedside, on 4L O2 with Zosyn infusing.    AFV, FHR, BPD Abnormalities

## 2025-06-30 NOTE — OB PROVIDER TRIAGE NOTE - NSHPLABSRESULTS_GEN_ALL_CORE
5/16/25   /206/184/163    12/26/25  A positive, antibody negative  Rubella immune  Measles immune  Varicella immune  HepBSag nonreactive  RPR nonreactive  HCV Nonreactive  HIV nonreactive    Sono @ 35w4d S>D, vtx, post placenta, EFW: 2260g, 7lb7oz (97%), AC >99%, BPP 8/8 Anterior post diameter of right renal pelvis is 16mm, calyceal dilation vs renal cyst, left kidney normal   Sono @ 32w0d: vertex, placenta posterior, SHONNA 19.28 (DVP 5.4), BPP 10/10, THE RIGHT RENAL PELVIS MEASURES 14.3 MM WITH CALYCEAL DILATION (UTD 2-3) THE LEFT FETAL KIDNEY APPEARS NORMAL  Sono @ 31wks Vtx, post placenta, EFW: 2210g, [id number] (98%), AC >99%, pyelectasis in right kidney, 1.1cm, BPP 8/8, Poly MVP 10.6  Sono @ 22.6wks variable, post placenta, fetal anatomy normal   Sono @ 21wks S=d, post placenta, nl anatomy but limited due to fetal position  Sono @ 13w5d NT WNL

## 2025-06-30 NOTE — OB PROVIDER TRIAGE NOTE - NSOBPROVIDERNOTE_OBGYN_ALL_OB_FT
20y.o.  @ 35w4d for prolonged monitoring , GDM A2, Asthma  - observation in L&D  - Continuous efm and toco  Dr. Evangelista aware 20y.o.  @ 35w4d for prolonged monitoring , GDM A2, Asthma  - observation in L&D  - Continuous efm and toco  Dr. Evangelista aware    ADDENDUM  Reassuring maternal and fetal status.     -Discharged Home  -Labor precautions reviewed  -PO Hydration encouraged  -Reviewed fetal kick counts  -Follow up with scheduled OB visit  -Patient verbalized understanding

## 2025-07-03 ENCOUNTER — INPATIENT (INPATIENT)
Facility: HOSPITAL | Age: 20
LOS: 0 days | Discharge: HOME | DRG: 566 | End: 2025-07-04
Attending: OBSTETRICS & GYNECOLOGY | Admitting: OBSTETRICS & GYNECOLOGY
Payer: MEDICAID

## 2025-07-03 ENCOUNTER — APPOINTMENT (OUTPATIENT)
Dept: ANTEPARTUM | Facility: CLINIC | Age: 20
End: 2025-07-03
Payer: MEDICAID

## 2025-07-03 ENCOUNTER — ASOB RESULT (OUTPATIENT)
Age: 20
End: 2025-07-03

## 2025-07-03 ENCOUNTER — APPOINTMENT (OUTPATIENT)
Dept: MATERNAL FETAL MEDICINE | Facility: CLINIC | Age: 20
End: 2025-07-03

## 2025-07-03 VITALS
SYSTOLIC BLOOD PRESSURE: 130 MMHG | WEIGHT: 232 LBS | BODY MASS INDEX: 38.65 KG/M2 | DIASTOLIC BLOOD PRESSURE: 80 MMHG | HEART RATE: 98 BPM | HEIGHT: 65 IN

## 2025-07-03 VITALS
RESPIRATION RATE: 16 BRPM | TEMPERATURE: 98 F | SYSTOLIC BLOOD PRESSURE: 118 MMHG | DIASTOLIC BLOOD PRESSURE: 77 MMHG | HEART RATE: 103 BPM

## 2025-07-03 DIAGNOSIS — O24.415 GESTATIONAL DIABETES MELLITUS IN PREGNANCY, CONTROLLED BY ORAL HYPOGLYCEMIC DRUGS: ICD-10-CM

## 2025-07-03 DIAGNOSIS — O26.893 OTHER SPECIFIED PREGNANCY RELATED CONDITIONS, THIRD TRIMESTER: ICD-10-CM

## 2025-07-03 DIAGNOSIS — O26.899 OTHER SPECIFIED PREGNANCY RELATED CONDITIONS, UNSPECIFIED TRIMESTER: ICD-10-CM

## 2025-07-03 DIAGNOSIS — R10.9 UNSPECIFIED ABDOMINAL PAIN: ICD-10-CM

## 2025-07-03 DIAGNOSIS — O36.8330 MATERNAL CARE FOR ABNORMALITIES OF THE FETAL HEART RATE OR RHYTHM, THIRD TRIMESTER, NOT APPLICABLE OR UNSPECIFIED: ICD-10-CM

## 2025-07-03 DIAGNOSIS — Z3A.35 35 WEEKS GESTATION OF PREGNANCY: ICD-10-CM

## 2025-07-03 DIAGNOSIS — O35.8XX0 MATERNAL CARE FOR OTHER (SUSPECTED) FETAL ABNORMALITY AND DAMAGE, NOT APPLICABLE OR UNSPECIFIED: ICD-10-CM

## 2025-07-03 LAB
ALBUMIN SERPL ELPH-MCNC: 3.5 G/DL — SIGNIFICANT CHANGE UP (ref 3.5–5.2)
ALP SERPL-CCNC: 222 U/L — HIGH (ref 30–115)
ALT FLD-CCNC: 114 U/L — HIGH (ref 14–37)
ANION GAP SERPL CALC-SCNC: 15 MMOL/L — HIGH (ref 7–14)
APPEARANCE UR: ABNORMAL
AST SERPL-CCNC: 28 U/L — SIGNIFICANT CHANGE UP (ref 14–37)
BASOPHILS # BLD AUTO: 0 K/UL — SIGNIFICANT CHANGE UP (ref 0–0.2)
BASOPHILS NFR BLD AUTO: 0 % — SIGNIFICANT CHANGE UP (ref 0–1)
BILIRUB SERPL-MCNC: 0.3 MG/DL — SIGNIFICANT CHANGE UP (ref 0.2–1.2)
BILIRUB UR-MCNC: NEGATIVE — SIGNIFICANT CHANGE UP
BUN SERPL-MCNC: 10 MG/DL — SIGNIFICANT CHANGE UP (ref 10–20)
CALCIUM SERPL-MCNC: 9.2 MG/DL — SIGNIFICANT CHANGE UP (ref 8.4–10.5)
CHLORIDE SERPL-SCNC: 103 MMOL/L — SIGNIFICANT CHANGE UP (ref 98–110)
CO2 SERPL-SCNC: 18 MMOL/L — SIGNIFICANT CHANGE UP (ref 17–32)
COLOR SPEC: YELLOW — SIGNIFICANT CHANGE UP
CREAT SERPL-MCNC: <0.5 MG/DL — LOW (ref 0.7–1.5)
DIFF PNL FLD: NEGATIVE — SIGNIFICANT CHANGE UP
EGFR: 145 ML/MIN/1.73M2 — SIGNIFICANT CHANGE UP
EGFR: 145 ML/MIN/1.73M2 — SIGNIFICANT CHANGE UP
EOSINOPHIL # BLD AUTO: 0.3 K/UL — SIGNIFICANT CHANGE UP (ref 0–0.7)
EOSINOPHIL NFR BLD AUTO: 1.7 % — SIGNIFICANT CHANGE UP (ref 0–8)
GLUCOSE BLDC GLUCOMTR-MCNC: 102 MG/DL — HIGH (ref 70–99)
GLUCOSE BLDC GLUCOMTR-MCNC: 122 MG/DL — HIGH (ref 70–99)
GLUCOSE SERPL-MCNC: 75 MG/DL — SIGNIFICANT CHANGE UP (ref 70–99)
GLUCOSE UR QL: NEGATIVE MG/DL — SIGNIFICANT CHANGE UP
HCT VFR BLD CALC: 29.9 % — LOW (ref 37–47)
HGB BLD-MCNC: 9.5 G/DL — LOW (ref 12–16)
KETONES UR QL: NEGATIVE MG/DL — SIGNIFICANT CHANGE UP
LEUKOCYTE ESTERASE UR-ACNC: ABNORMAL
LYMPHOCYTES # BLD AUTO: 1.37 K/UL — SIGNIFICANT CHANGE UP (ref 1.2–3.4)
LYMPHOCYTES # BLD AUTO: 7.8 % — LOW (ref 20.5–51.1)
MCHC RBC-ENTMCNC: 24.8 PG — LOW (ref 27–31)
MCHC RBC-ENTMCNC: 31.8 G/DL — LOW (ref 32–37)
MCV RBC AUTO: 78.1 FL — LOW (ref 81–99)
MONOCYTES # BLD AUTO: 1.37 K/UL — HIGH (ref 0.1–0.6)
MONOCYTES NFR BLD AUTO: 7.8 % — SIGNIFICANT CHANGE UP (ref 1.7–9.3)
NEUTROPHILS # BLD AUTO: 13.73 K/UL — HIGH (ref 1.4–6.5)
NEUTROPHILS NFR BLD AUTO: 74.8 % — SIGNIFICANT CHANGE UP (ref 42.2–75.2)
NITRITE UR-MCNC: NEGATIVE — SIGNIFICANT CHANGE UP
PH UR: 6.5 — SIGNIFICANT CHANGE UP (ref 5–8)
PLATELET # BLD AUTO: 331 K/UL — SIGNIFICANT CHANGE UP (ref 130–400)
PMV BLD: 10.7 FL — HIGH (ref 7.4–10.4)
POTASSIUM SERPL-MCNC: 4.3 MMOL/L — SIGNIFICANT CHANGE UP (ref 3.5–5)
POTASSIUM SERPL-SCNC: 4.3 MMOL/L — SIGNIFICANT CHANGE UP (ref 3.5–5)
PROT SERPL-MCNC: 6.4 G/DL — SIGNIFICANT CHANGE UP (ref 6–8)
PROT UR-MCNC: NEGATIVE MG/DL — SIGNIFICANT CHANGE UP
RBC # BLD: 3.83 M/UL — LOW (ref 4.2–5.4)
RBC # FLD: 15.9 % — HIGH (ref 11.5–14.5)
SODIUM SERPL-SCNC: 136 MMOL/L — SIGNIFICANT CHANGE UP (ref 135–146)
SP GR SPEC: 1.02 — SIGNIFICANT CHANGE UP (ref 1–1.03)
UROBILINOGEN FLD QL: 0.2 MG/DL — SIGNIFICANT CHANGE UP (ref 0.2–1)
WBC # BLD: 17.54 K/UL — HIGH (ref 4.8–10.8)
WBC # FLD AUTO: 17.54 K/UL — HIGH (ref 4.8–10.8)

## 2025-07-03 PROCEDURE — 99418 PROLNG IP/OBS E/M EA 15 MIN: CPT | Mod: 25

## 2025-07-03 PROCEDURE — 81001 URINALYSIS AUTO W/SCOPE: CPT

## 2025-07-03 PROCEDURE — ZZZZZ: CPT

## 2025-07-03 PROCEDURE — 87801 DETECT AGNT MULT DNA AMPLI: CPT

## 2025-07-03 PROCEDURE — 82962 GLUCOSE BLOOD TEST: CPT

## 2025-07-03 PROCEDURE — 36415 COLL VENOUS BLD VENIPUNCTURE: CPT

## 2025-07-03 PROCEDURE — 86850 RBC ANTIBODY SCREEN: CPT

## 2025-07-03 PROCEDURE — 87653 STREP B DNA AMP PROBE: CPT

## 2025-07-03 PROCEDURE — 86900 BLOOD TYPING SEROLOGIC ABO: CPT

## 2025-07-03 PROCEDURE — 87563 M. GENITALIUM AMP PROBE: CPT

## 2025-07-03 PROCEDURE — 87798 DETECT AGENT NOS DNA AMP: CPT

## 2025-07-03 PROCEDURE — 99223 1ST HOSP IP/OBS HIGH 75: CPT | Mod: 25

## 2025-07-03 PROCEDURE — 76818 FETAL BIOPHYS PROFILE W/NST: CPT

## 2025-07-03 PROCEDURE — 87661 TRICHOMONAS VAGINALIS AMPLIF: CPT

## 2025-07-03 PROCEDURE — 99222 1ST HOSP IP/OBS MODERATE 55: CPT

## 2025-07-03 PROCEDURE — 85025 COMPLETE CBC W/AUTO DIFF WBC: CPT

## 2025-07-03 PROCEDURE — 86901 BLOOD TYPING SEROLOGIC RH(D): CPT

## 2025-07-03 PROCEDURE — 80053 COMPREHEN METABOLIC PANEL: CPT

## 2025-07-03 RX ORDER — METFORMIN HYDROCHLORIDE 500 MG/1
1000 TABLET ORAL AT BEDTIME
Refills: 0 | Status: DISCONTINUED | OUTPATIENT
Start: 2025-07-03 | End: 2025-07-04

## 2025-07-03 RX ORDER — PRENATAL 136/IRON/FOLIC ACID 27 MG-1 MG
1 TABLET ORAL DAILY
Refills: 0 | Status: DISCONTINUED | OUTPATIENT
Start: 2025-07-03 | End: 2025-07-04

## 2025-07-03 RX ADMIN — METFORMIN HYDROCHLORIDE 1000 MILLIGRAM(S): 500 TABLET ORAL at 22:17

## 2025-07-03 NOTE — OB PROVIDER H&P - ATTENDING COMMENTS
MFM Attending    Seen and examined patient with resident.   Agree with plan as above.    Total time spent 60 minutes excluding teaching and separately reported services.     Ralf March MD  Maternal Fetal Medicine

## 2025-07-03 NOTE — OB RN PATIENT PROFILE - INSTRUCTED TO PATIENT: IF THE INFANT IS NOT PINK DURING SKIN TO SKIN, THE PARENTS IS TO SEEK ASSISTANCE IMMEDIATELY.
Statement Selected

## 2025-07-03 NOTE — OB PROVIDER H&P - NSHPLABSRESULTS_GEN_ALL_CORE
5/16/25   /206/184/163    12/26/25  A positive, antibody negative  Rubella immune  Measles immune  Varicella immune  HepBSag nonreactive  RPR nonreactive  HCV Nonreactive  HIV nonreactive    Sono @ 35w4d S>D, vtx, post placenta, EFW: 2260g, 7lb7oz (97%), AC >99%, BPP 8/8 Anterior post diameter of right renal pelvis is 16mm, calyceal dilation vs renal cyst, left kidney normal   Sono @ 32w0d: vertex, placenta posterior, SHONNA 19.28 (DVP 5.4), BPP 10/10, THE RIGHT RENAL PELVIS MEASURES 14.3 MM WITH CALYCEAL DILATION (UTD 2-3) THE LEFT FETAL KIDNEY APPEARS NORMAL  Sono @ 31wks Vtx, post placenta, EFW: 2210g, [id number] (98%), AC >99%, pyelectasis in right kidney, 1.1cm, BPP 8/8, Poly MVP 10.6  Sono @ 22.6wks variable, post placenta, fetal anatomy normal   Sono @ 21wks S=d, post placenta, nl anatomy but limited due to fetal position  Sono @ 13w5d NT WNL

## 2025-07-03 NOTE — OB PROVIDER H&P - ASSESSMENT
20y.o.  @36w0d, GDMA2 on metformin (non-compliant with medication)    - Admit to L&D for monitoring of blood sugars  - fasting and 2 hr post prandial BG checks  - Continue metformin 1000mg PO nightly  - Monitor vitals  - EFM and TOCO 21 yo  @36w0d, GBS unknown, h/o asthma, with GDMA2 on metformin (non-compliant with medication), admitted for glycemic control and evaluation    #GDMA2  - Admit to antepartum   - fasting and 2 hr post prandial BG checks  - Continue metformin 1000mg PO nightly  - gestational diabetic diet  - MFM following    #pregnancy  - Monitor vitals  - NST BID  - ambulate as tolerated  - GBS, GCCT  - active T&S, labs  - IV

## 2025-07-03 NOTE — OB PROVIDER H&P - NSHPPHYSICALEXAM_GEN_ALL_CORE
Vital Signs Last 24 Hrs  T(C): 36.7 (03 Jul 2025 14:14), Max: 36.7 (03 Jul 2025 14:14)  T(F): 98.1 (03 Jul 2025 14:14), Max: 98.1 (03 Jul 2025 14:14)  HR: 103 (03 Jul 2025 14:15) (103 - 103)  BP: 118/77 (03 Jul 2025 14:15) (118/77 - 118/77)  RR: 16 (03 Jul 2025 14:14) (16 - 16)    Abd: gravid, soft, NT, no palpable ctx  SVE deferred  TOCO: irreg  FHR: 140bpm/moderate/+accels
no

## 2025-07-03 NOTE — OB PROVIDER H&P - HISTORY OF PRESENT ILLNESS
Pt is a 20y.o.  @ 36w0d dated by 1st trimester sono presents for management of poor glycemic control. She reports slightly decreased FM in the last 2-3 days. BPP 7/3 in office 10/10, NST reactive. MVP 4.51. Pt denies LOF, VB, ctx.    Pregnancy c/b:  #GDMA2  -Pt prescribed metformin 1000mg PO nightly but is not consistent with taking medications.   -Pt reports fasting sugars have ranged about , post prandial 95-120s, with some numbers in 130s 140s. She has not taken her home fingersticks for the last 2 days.  -Baby suspected LGA- EFW  3360g, 7lb7oz (97%), AC >99%, SHONNA 21, MVP 5.6    #Baby w/ right renal pyelectasis 16mm, calyceal dilation vs renal cyst    #h/o Asthma  -no medications, reports they  and her PMD retired. pt lives w/ 8 cats and reports she is allergic  -pt does not follow with pulmonologist   -has never been hospitalized for asthma/exacerbation   Pt is a 20 y.o.  @ 36w0d dated by 1st trimester sono presents for management of poor glycemic control. She reports slightly decreased FM in the last 2-3 days. BPP today in office 10/10, NST reactive. MVP 4.51. Pt denies LOF, VB, ctx.    Pregnancy c/b:  #GDMA2  -Pt prescribed metformin 1000mg PO nightly but is not consistent with taking medications.   -Pt reports fasting sugars have ranged about , post prandial 95-120s, with some numbers in 130s 140s. She has not taken her home fingersticks for the last 2 days.  -Baby suspected LGA- EFW  3360g, 7lb7oz (97%), AC >99%, SHONNA 21, MVP 5.6    #Baby w/ right renal pyelectasis 16mm, calyceal dilation vs renal cyst    #h/o Asthma  -no medications, reports they  and her PMD retired. pt lives w/ 8 cats and reports she is allergic  -pt does not follow with pulmonologist   -has never been hospitalized for asthma/exacerbation   Pt is a 20 y.o.  @ 36w0d dated by 1st trimester sono presents for management of poor glycemic control. She reports slightly decreased FM in the last 2-3 days. BPP today in office 10/10, NST reactive. MVP 4.51. Pt denies LOF, VB, ctx. GBS unknown.    Pregnancy c/b:  #GDMA2  -Pt prescribed metformin 1000mg PO nightly but is not consistent with taking medications.   -Pt reports fasting sugars have ranged about , post prandial 95-120s, with some numbers in 130s 140s. She has not taken her home fingersticks for the last 2 days.  -Baby suspected LGA- EFW  3360g, 7lb7oz (97%), AC >99%, SHONNA 21, MVP 5.6    #Baby w/ right renal pyelectasis 16mm, calyceal dilation vs renal cyst    #h/o Asthma  -no medications, reports they  and her PMD retired. pt lives w/ 8 cats and reports she is allergic  -pt does not follow with pulmonologist   -has never been hospitalized for asthma/exacerbation

## 2025-07-04 VITALS — HEART RATE: 88 BPM | SYSTOLIC BLOOD PRESSURE: 120 MMHG | DIASTOLIC BLOOD PRESSURE: 58 MMHG | TEMPERATURE: 98 F

## 2025-07-04 LAB — GLUCOSE BLDC GLUCOMTR-MCNC: 105 MG/DL — HIGH (ref 70–99)

## 2025-07-04 PROCEDURE — 99238 HOSP IP/OBS DSCHRG MGMT 30/<: CPT

## 2025-07-04 RX ORDER — METFORMIN HYDROCHLORIDE 500 MG/1
1000 TABLET ORAL EVERY 12 HOURS
Refills: 0 | Status: DISCONTINUED | OUTPATIENT
Start: 2025-07-04 | End: 2025-07-04

## 2025-07-04 RX ORDER — METFORMIN HYDROCHLORIDE 500 MG/1
1 TABLET ORAL
Qty: 28 | Refills: 0
Start: 2025-07-04 | End: 2025-07-17

## 2025-07-04 RX ORDER — PRENATAL 136/IRON/FOLIC ACID 27 MG-1 MG
1 TABLET ORAL
Qty: 0 | Refills: 0 | DISCHARGE
Start: 2025-07-04

## 2025-07-04 NOTE — DISCHARGE NOTE OB - HOSPITAL COURSE
Pt admitted to antepartum service from [facility] office to blood sugar monitoring. Pt's fasting sugars still found to be elevated on Metformin 1000mg QD, [facility] advised to uptitrate medication to 1000mg BID. Pt otherwise doing well, no labor symptoms. Stable. DC home with instructions to follow up with PMD and schedule IOL at 37wks.

## 2025-07-04 NOTE — DISCHARGE NOTE OB - NS MD DC FALL RISK RISK
For information on Fall & Injury Prevention, visit: [email or web address] OR  [email or web address] OR  https://www.cdc.gov/steadi/patient.html

## 2025-07-04 NOTE — PROGRESS NOTE ADULT - SUBJECTIVE AND OBJECTIVE BOX
note    21 yo  @36w0d, GBS unknown, h/o asthma, with GDMA2 on metformin (non-compliant with medication), admitted for glycemic control and evaluation    Patient presented to triage and was evaluated with continuous fhr monitoring from  14:11-17:05  21:37-22:12     I spent 209 minutes caring for the patient. It included reviewing the  history and examination, continuously monitoring the fetus and interpretation of the fetal heart rate strip, documentingin the medical record and a multiple safety rounds
PGY2 NOTE    Gestational Age: 36w1d  Hospital Day: 1    HPI: Pt seen and examined at bedside. She is doing well, no overnight events, no acute complaints. She denies contractions, leakage of fluid, or vaginal bleeding/discharge. She feels regular fetal movement.  Ambulating yes  Voiding yes  Diet diabetic diet  Denies HA, lightheadedness, palpitations, N/V, fevers, chills, CP, SOB, LE edema,  urinary symptoms, changes in bowel habits.    PAST MEDICAL & SURGICAL HISTORY:  Asthma  No significant past surgical history    Vital Signs Last 24 Hrs  T(F): 97.8 (04 Jul 2025 04:17), Max: 98.2 (03 Jul 2025 22:15)  HR: 75 (04 Jul 2025 04:17) (75 - 103)  BP: 110/53 (04 Jul 2025 04:17) (110/53 - 130/62)  RR: 18 (03 Jul 2025 22:15) (16 - 18)    Physical exam:  General - AAOx3, NAD  Abdomen - Gravid, soft, nontender, no palpable contractions  Extremities - No calf tenderness, no swelling    Labs:                        9.5    17.54 )-----------( 331      ( 03 Jul 2025 17:17 )             29.9     136  |  103  |  10  ----------------------------<75  4.3  |  18  |  <0.5          Antibody Screen: NEG (07-03-25 @ 17:17)

## 2025-07-04 NOTE — DISCHARGE NOTE OB - CARE PLAN
Principal Discharge DX:	Gestational diabetes  Assessment and plan of treatment:	Please continue taking Metformin 1000mg twice a day, 1 time in the morning and 1 time in the evening. Please continue monitoring your blood sugars and recording a log.   Dr. Evangelista will schedule you for induction close to 37 weeks   1

## 2025-07-04 NOTE — DISCHARGE NOTE OB - MEDICATION SUMMARY - MEDICATIONS TO TAKE
I will START or STAY ON the medications listed below when I get home from the hospital:    metFORMIN 1000 mg oral tablet  -- 1 tab(s) by mouth every 12 hours  -- Indication: For diabetes    Claritin  -- Indication: For allergies    Prenatal Multivitamins with Folic Acid 1 mg oral tablet  -- 1 tab(s) by mouth once a day  -- Indication: For pregnant

## 2025-07-04 NOTE — DISCHARGE NOTE OB - PATIENT PORTAL LINK FT
You can access the FollowMyHealth Patient Portal offered by [facility] by registering at the following website: [email or web address]. By joining [facility]’s FollowMyHealth portal, you will also be able to view your health information using other applications (apps) compatible with our system.

## 2025-07-04 NOTE — PROGRESS NOTE ADULT - ATTENDING COMMENTS
MFM Attending    Seen and examined patient with resident.   Plan for Metformin 1000 mg BID.  Discharge home.  Recommend delivery by induction of labor at 37 weeks gestation or sooner as clinically indicated.  Patients questions were answered.    Total time 30 minutes    Ralf March MD  Maternal Fetal Medicine

## 2025-07-04 NOTE — DISCHARGE NOTE OB - PLAN OF CARE
Please continue taking Metformin 1000mg twice a day, 1 time in the morning and 1 time in the evening. Please continue monitoring your blood sugars and recording a log.   Dr. Evangelista will schedule you for induction close to 37 weeks

## 2025-07-04 NOTE — DISCHARGE NOTE OB - MEDICATION SUMMARY - MEDICATIONS TO STOP TAKING
I will STOP taking the medications listed below when I get home from the hospital:    ondansetron 4 mg oral tablet, disintegrating  -- 1 tab(s) by mouth 3 times a day as needed for  nausea    Macrobid 100 mg oral capsule  -- 1 cap(s) by mouth every 12 hours    Pepcid 20 mg oral tablet  -- 1 tab(s) by mouth 2 times a day    ondansetron 4 mg oral tablet, disintegrating  -- 1 tab(s) by mouth every 8 hours    Claritin    Vyvanse

## 2025-07-04 NOTE — DISCHARGE NOTE OB - FINANCIAL ASSISTANCE
[facility] provides services at a reduced cost to those who are determined to be eligible through [facility]’s financial assistance program. Information regarding [facility]’s financial assistance program can be found by going to [email or web address] or by calling [phone].

## 2025-07-04 NOTE — PROGRESS NOTE ADULT - ASSESSMENT
19 yo  @36w1d, GBS unknown, h/o asthma, with GDMA2 on metformin (non-compliant with medication), admitted for glycemic control and evaluation    #elevated LFTs  - f/u CBC, CMP  - Pt explains blood work was attempted and due to inability to successfully draw labs, she is now refusing blood work. Provider firmly explained significance and importance of following up bloodwork. Patient expressed understanding and continued to refuse. Will attempt at another time.     #GDMA2  - fasting and 2 hr post prandial BG checks  - Continue metformin 1000mg PO nightly  - gestational diabetic diet  - MFM following    #pregnancy  - Monitor vitals  - NST BID  - ambulate as tolerated  - GBS, GCCT  - active T&S, labs  - IV

## 2025-07-04 NOTE — CHART NOTE - NSCHARTNOTEFT_GEN_A_CORE
MFM procedure note    NST   GA: 32w4d  indication: glycemic control    start time: 2138  end time: 2213    baseline:140s  variability:moderate  accelerations: present  decelerations: absent    reactive NST MFM procedure note    NST   GA: 32w4d  indication: glycemic control    start time: 2138  end time: 2213    baseline:140s  variability:moderate  accelerations: present  decelerations: absent    Tocometer: none    reactive NST MFM procedure note    NST   GA: 32w4d  indication: glycemic control    start time: 2138  end time: 2213    baseline:140s  variability:moderate  accelerations: present  decelerations: absent    Tocometer: none    reactive NST    MFM Attending    NST reviewed and agree with documentation above.    Ralf March MD  Maternal Fetal Medicine

## 2025-07-04 NOTE — DISCHARGE NOTE OB - CARE PROVIDER_API CALL
Ralf Evangelista  Obstetrics & Gynecology  [address] [place] [address]  Phone: [phone]  Fax: [phone]  Follow Up Time:     Ralf March  Maternal and Fetal Medicine  [address] [place] [address]  Phone: [phone]  Fax: [phone]  Follow Up Time:

## 2025-07-05 LAB
GROUP B BETA STREP DNA (PCR): SIGNIFICANT CHANGE UP
SOURCE GROUP B STREP: SIGNIFICANT CHANGE UP

## 2025-07-07 LAB
A VAGINAE DNA VAG QL NAA+PROBE: SIGNIFICANT CHANGE UP
BVAB2 DNA VAG QL NAA+PROBE: SIGNIFICANT CHANGE UP
C ALBICANS DNA VAG QL NAA+PROBE: NEGATIVE — SIGNIFICANT CHANGE UP
C GLABRATA DNA VAG QL NAA+PROBE: NEGATIVE — SIGNIFICANT CHANGE UP
M GENITALIUM DNA SPEC QL NAA+PROBE: NEGATIVE — SIGNIFICANT CHANGE UP
M HOMINIS DNA SPEC QL NAA+PROBE: NEGATIVE — SIGNIFICANT CHANGE UP
MEGA1 DNA VAG QL NAA+PROBE: SIGNIFICANT CHANGE UP
T VAGINALIS RRNA SPEC QL NAA+PROBE: NEGATIVE — SIGNIFICANT CHANGE UP
UREAPLASMA DNA SPEC QL NAA+PROBE: POSITIVE

## 2025-07-08 ENCOUNTER — ASOB RESULT (OUTPATIENT)
Age: 20
End: 2025-07-08

## 2025-07-08 ENCOUNTER — APPOINTMENT (OUTPATIENT)
Dept: ANTEPARTUM | Facility: CLINIC | Age: 20
End: 2025-07-08
Payer: MEDICAID

## 2025-07-08 VITALS
SYSTOLIC BLOOD PRESSURE: 121 MMHG | HEIGHT: 65 IN | DIASTOLIC BLOOD PRESSURE: 76 MMHG | WEIGHT: 233 LBS | BODY MASS INDEX: 38.82 KG/M2 | HEART RATE: 105 BPM

## 2025-07-08 PROBLEM — Z3A.33 33 WEEKS GESTATION OF PREGNANCY: Status: RESOLVED | Noted: 2025-06-17 | Resolved: 2025-07-08

## 2025-07-08 PROBLEM — O99.210 MATERNAL OBESITY, ANTEPARTUM: Status: ACTIVE | Noted: 2025-06-17

## 2025-07-08 PROBLEM — O35.EXX0 FETAL HYDRONEPHROSIS DURING PREGNANCY, ANTEPARTUM: Status: ACTIVE | Noted: 2025-07-08

## 2025-07-08 PROBLEM — Z3A.36 36 WEEKS GESTATION OF PREGNANCY: Status: ACTIVE | Noted: 2025-07-08

## 2025-07-08 PROCEDURE — 99211 OFF/OP EST MAY X REQ PHY/QHP: CPT | Mod: 25

## 2025-07-08 PROCEDURE — 76818 FETAL BIOPHYS PROFILE W/NST: CPT

## 2025-07-09 ENCOUNTER — INPATIENT (INPATIENT)
Facility: HOSPITAL | Age: 20
LOS: 4 days | Discharge: HOME | DRG: 540 | End: 2025-07-14
Attending: OBSTETRICS & GYNECOLOGY | Admitting: OBSTETRICS & GYNECOLOGY
Payer: MEDICAID

## 2025-07-09 VITALS — DIASTOLIC BLOOD PRESSURE: 61 MMHG | SYSTOLIC BLOOD PRESSURE: 129 MMHG | HEART RATE: 100 BPM

## 2025-07-09 DIAGNOSIS — Z00.00 ENCOUNTER FOR GENERAL ADULT MEDICAL EXAMINATION WITHOUT ABNORMAL FINDINGS: ICD-10-CM

## 2025-07-09 LAB
APPEARANCE UR: CLEAR — SIGNIFICANT CHANGE UP
BASOPHILS # BLD AUTO: 0.08 K/UL — SIGNIFICANT CHANGE UP (ref 0–0.2)
BASOPHILS NFR BLD AUTO: 0.5 % — SIGNIFICANT CHANGE UP (ref 0–1)
BILIRUB UR-MCNC: NEGATIVE — SIGNIFICANT CHANGE UP
COLOR SPEC: YELLOW — SIGNIFICANT CHANGE UP
DIFF PNL FLD: NEGATIVE — SIGNIFICANT CHANGE UP
EOSINOPHIL # BLD AUTO: 0.24 K/UL — SIGNIFICANT CHANGE UP (ref 0–0.7)
EOSINOPHIL NFR BLD AUTO: 1.6 % — SIGNIFICANT CHANGE UP (ref 0–8)
GLUCOSE BLDC GLUCOMTR-MCNC: 109 MG/DL — HIGH (ref 70–99)
GLUCOSE UR QL: NEGATIVE MG/DL — SIGNIFICANT CHANGE UP
HCT VFR BLD CALC: 29.5 % — LOW (ref 37–47)
HGB BLD-MCNC: 9.2 G/DL — LOW (ref 12–16)
IMM GRANULOCYTES NFR BLD AUTO: 5.1 % — HIGH (ref 0.1–0.3)
KETONES UR QL: NEGATIVE MG/DL — SIGNIFICANT CHANGE UP
LEUKOCYTE ESTERASE UR-ACNC: NEGATIVE — SIGNIFICANT CHANGE UP
LYMPHOCYTES # BLD AUTO: 17.8 % — LOW (ref 20.5–51.1)
LYMPHOCYTES # BLD AUTO: 2.67 K/UL — SIGNIFICANT CHANGE UP (ref 1.2–3.4)
MCHC RBC-ENTMCNC: 24.6 PG — LOW (ref 27–31)
MCHC RBC-ENTMCNC: 31.2 G/DL — LOW (ref 32–37)
MCV RBC AUTO: 78.9 FL — LOW (ref 81–99)
MONOCYTES # BLD AUTO: 1.22 K/UL — HIGH (ref 0.1–0.6)
MONOCYTES NFR BLD AUTO: 8.2 % — SIGNIFICANT CHANGE UP (ref 1.7–9.3)
NEUTROPHILS # BLD AUTO: 9.98 K/UL — HIGH (ref 1.4–6.5)
NEUTROPHILS NFR BLD AUTO: 66.8 % — SIGNIFICANT CHANGE UP (ref 42.2–75.2)
NITRITE UR-MCNC: NEGATIVE — SIGNIFICANT CHANGE UP
NRBC BLD AUTO-RTO: 0 /100 WBCS — SIGNIFICANT CHANGE UP (ref 0–0)
PH UR: 7 — SIGNIFICANT CHANGE UP (ref 5–8)
PLATELET # BLD AUTO: 332 K/UL — SIGNIFICANT CHANGE UP (ref 130–400)
PMV BLD: 11.2 FL — HIGH (ref 7.4–10.4)
PROT UR-MCNC: NEGATIVE MG/DL — SIGNIFICANT CHANGE UP
RBC # BLD: 3.74 M/UL — LOW (ref 4.2–5.4)
RBC # FLD: 15.7 % — HIGH (ref 11.5–14.5)
SP GR SPEC: 1 — SIGNIFICANT CHANGE UP (ref 1–1.03)
UROBILINOGEN FLD QL: 0.2 MG/DL — SIGNIFICANT CHANGE UP (ref 0.2–1)
WBC # BLD: 14.96 K/UL — HIGH (ref 4.8–10.8)
WBC # FLD AUTO: 14.96 K/UL — HIGH (ref 4.8–10.8)

## 2025-07-09 PROCEDURE — 82962 GLUCOSE BLOOD TEST: CPT

## 2025-07-09 PROCEDURE — 85025 COMPLETE CBC W/AUTO DIFF WBC: CPT

## 2025-07-09 PROCEDURE — 88307 TISSUE EXAM BY PATHOLOGIST: CPT

## 2025-07-09 PROCEDURE — 59025 FETAL NON-STRESS TEST: CPT

## 2025-07-09 PROCEDURE — 86850 RBC ANTIBODY SCREEN: CPT

## 2025-07-09 PROCEDURE — 86900 BLOOD TYPING SEROLOGIC ABO: CPT

## 2025-07-09 PROCEDURE — 94640 AIRWAY INHALATION TREATMENT: CPT

## 2025-07-09 PROCEDURE — 71046 X-RAY EXAM CHEST 2 VIEWS: CPT

## 2025-07-09 PROCEDURE — 81003 URINALYSIS AUTO W/O SCOPE: CPT

## 2025-07-09 PROCEDURE — 86901 BLOOD TYPING SEROLOGIC RH(D): CPT

## 2025-07-09 PROCEDURE — 86592 SYPHILIS TEST NON-TREP QUAL: CPT

## 2025-07-09 PROCEDURE — 80307 DRUG TEST PRSMV CHEM ANLYZR: CPT

## 2025-07-09 PROCEDURE — 93005 ELECTROCARDIOGRAM TRACING: CPT

## 2025-07-09 PROCEDURE — 86703 HIV-1/HIV-2 1 RESULT ANTBDY: CPT

## 2025-07-09 PROCEDURE — 88302 TISSUE EXAM BY PATHOLOGIST: CPT

## 2025-07-09 PROCEDURE — 80354 DRUG SCREENING FENTANYL: CPT

## 2025-07-09 PROCEDURE — 87637 SARSCOV2&INF A&B&RSV AMP PRB: CPT

## 2025-07-09 PROCEDURE — 36415 COLL VENOUS BLD VENIPUNCTURE: CPT

## 2025-07-09 RX ORDER — SODIUM CHLORIDE 9 G/1000ML
1000 INJECTION, SOLUTION INTRAVENOUS
Refills: 0 | Status: DISCONTINUED | OUTPATIENT
Start: 2025-07-09 | End: 2025-07-10

## 2025-07-09 RX ORDER — LIDOCAINE HCL/PF 10 MG/ML
30 VIAL (ML) INJECTION ONCE
Refills: 0 | Status: DISCONTINUED | OUTPATIENT
Start: 2025-07-09 | End: 2025-07-10

## 2025-07-09 RX ORDER — CARBOPROST TROMETHAMINE 250 UG/ML
250 INJECTION, SOLUTION INTRAMUSCULAR ONCE
Refills: 0 | Status: DISCONTINUED | OUTPATIENT
Start: 2025-07-09 | End: 2025-07-09

## 2025-07-09 RX ORDER — OXYTOCIN-SODIUM CHLORIDE 0.9% IV SOLN 30 UNIT/500ML 30-0.9/5 UT/ML-%
167 SOLUTION INTRAVENOUS
Qty: 30 | Refills: 0 | Status: DISCONTINUED | OUTPATIENT
Start: 2025-07-09 | End: 2025-07-10

## 2025-07-09 RX ORDER — TRANEXAMIC ACID 1000 MG/10
1000 AMPUL (ML) INTRAVENOUS ONCE
Refills: 0 | Status: DISCONTINUED | OUTPATIENT
Start: 2025-07-09 | End: 2025-07-10

## 2025-07-09 NOTE — OB PROVIDER H&P - HISTORY OF PRESENT ILLNESS
Pt is a 20 y.o.  @37w dated by 1st trimester sono presents for IOL for GDMA2, poor glycemic control. Pt denies LOF, VB, ctx. +FM. GBS neg.    Pregnancy c/b:  #GDMA2  -Pt prescribed metformin 1000mg PO nightly but is not consistent with taking medications.   -Pt reports fasting sugars have ranged about , post prandial 95-120s, with some numbers in 130s 140s. She has not taken her home fingersticks for the last 2 days.  -Baby suspected LGA- on  EFW 3360g, 7lb7oz (97%), AC >99%, SHONNA 21, MVP 5.6    #Baby w/ right renal pyelectasis 16mm, calyceal dilation vs renal cyst    #h/o Asthma  -no medications, reports they  and her PMD retired. pt lives w/ 8 cats and reports she is allergic  -pt does not follow with pulmonologist   -has never been hospitalized for asthma/exacerbation   Pt is a 20 y.o.  @36w6d dated by 1st trimester sono presents for IOL for GDMA2, poor glycemic control. Pt denies LOF, VB, ctx. +FM. GBS neg.    Pregnancy c/b:  #GDMA2  -Pt prescribed metformin 1000mg PO nightly but is not consistent with taking medications.   -Pt reports fasting sugars have ranged about , post prandial 95-120s, with some numbers in 130s 140s. She has not taken her home fingersticks for the last 2 days.  -Baby suspected LGA- on  EFW 3360g, 7lb7oz (97%), AC >99%, SHONNA 21, MVP 5.6    #Baby w/ right renal pyelectasis 16mm, calyceal dilation vs renal cyst    #h/o Asthma  -no medications, reports they  and her PMD retired. pt lives w/ 8 cats and reports she is allergic  -pt does not follow with pulmonologist   -has never been hospitalized for asthma/exacerbation

## 2025-07-09 NOTE — OB RN PATIENT PROFILE - FUNCTIONAL ASSESSMENT - BASIC MOBILITY ASSESSMENT TYPE
Chronic Disease Management Services  Heart Failure Clinic Progress Note      Due to COVID-19 ACTION PLAN, the patient's office visit was converted to a phone visit.    The patient consents to a telephone visit in place of an in office visit.   Patient states they are Lisa Chen and are currently located at home during the course of our visit    It has been 4 months since the patient's last in-office visit.    The following testing was reviewed during this phone visit: n/a    Medication and allergy reconciliation completed over the phone.     The following problems were addressed during today's call:  Problem List Items Addressed This Visit     None          The following was ordered during today's call:   No orders of the defined types were placed in this encounter.       Time spent talking with patient during today's call: 15 minutes.    Patient was advised to call if they experience any new or worsening symptoms.    No follow-ups on file.    This call was made to Lisa Chen to discuss   Chief Complaint   Patient presents with   • CHF     She is an established patient of mine.  She is in [place] and her identity has been established.   Lisa understands that we are limiting office visits due to the coronavirus pandemic and she consents to a virtual visit with charges submitted to her insurance.   11-20 minutes were spent in this encounter.  Without the patient being seen and evaluated in person, there is a risk that the information and/or assessment may be incomplete or inaccurate.    Reason or Visit: Routine    Referred By: Dr Rosales    BP Readings from Last 6 Encounters:   01/09/20 149/81   11/20/19 172/87   10/31/19 155/88   10/09/19 163/90   09/25/19 (!) 88/53   09/25/19 130/79     Pulse Readings from Last 6 Encounters:   01/09/20 76   11/20/19 88   10/31/19 72   10/09/19 76   09/25/19 78   09/12/19 82     Wt Readings from Last 6 Encounters:   01/09/20 77 kg (169 lb 12.1 oz)    11/20/19 76.1 kg (167 lb 12.3 oz)   10/31/19 76.1 kg (167 lb 12.3 oz)   10/09/19 75.9 kg (167 lb 5.3 oz)   09/25/19 75.2 kg (165 lb 12.6 oz)   09/12/19 77 kg (169 lb 12.1 oz)         Visit History:    5/13/20 in [facility] telephone visit pt doesn't sound sob over phone. Pt reports BP just now after meds at 140/76 with HR of 92. Pt read meds from bottles correctly, son is still filling her pill box per pt. Advised reschedule PCP appt and CPM    1/9/2020 in [facility] pt doing well, forgot meds but says son filling box for her, BP better but still up a little but here 2 pm hydralazine due now. CPM, advised schedule PCP appt    11/20/19 in [facility] pt's son is filling box correctly but pt's BP still up. Gave 100 mg hydralazine for 2 pm dose. BP came down some. Advised increase hydralazine to 100 mg 3 times a day    10/31/19 in [facility] pt here to have pill box filled but didn't bring med bottles, says filled it herself. Probably not doing right since BP up, she agrees to have her granddaughter fill box    10/9/19 in [facility] pt has no more emesis or abd pain since emptied colonoscopy on 9/25. Today BP up some but pt didn't bring in pills on 9/25 for me to fill, she did it herself so questionable. Filled box for 2 weeks    9/25/19 in [facility] pt forgot to bring meds and pillboxes, said filled them herself for 2 weeks. After visit pt went to lab and got diaphoretic with abd pain and emesis, BP was low. Wanted to empty her colostomy and did. Saw Dr Crawford who wanted pt to go to ER but pt signed AMA. Pt tells me she felt much better after emptying her bag.    9/11/19 in [facility] midday dose of hydralazine due now so BP up a bit. Filled box for 2 weeks    8/28/19 in [facility], pt BP improved taking meds correctly now, filled box for 2 more weeks    8/14/19 in [facility] pt with very questionable med compliance, still only taking carvedilol daily and hydralazine twice a day. Pt agrees to come in for pill box  fills. Filled box today for 2 weeks    7/17/19 in [facility] pt \"been taking a lot of hydralazine, carvedilol once a day.\" Seems like she's randomly taking meds from pill packs, doesn't want them anymore, too expensive. PCP did stop clopidigrel, as pt says. Pt agrees to have her son take her to [facility] tonight and  her meds, advised carvedilol twice, not once a day, amlodipine, lisinopril HCTZ,, hydralazine, asa, and atorvastatin. Wrote med schedule of 8 am, 2 pm, and 8 pm.    4/12/19 in [facility], pt didn't bring prepackaged meds, says been taking, BP up, advised to call with meds      1/11/19: [facility], stable    Pt has history on med noncompliance due to finances and now forgetfullness.     Pt saw Dr Rosales last on 11/1/18 who said CPM, wants [facility] to reconcile meds, which was done     Pt saw Dr Rosales last on 5/26/16 who said she hadn't seen pt since 2014, said pt not taking beta blocker, Started metoprolol ER, ordered echo, and referred to [facility]. When pt had seen Dr Rosales in 7/14, pt wasn't taking any meds.    ER/Hospitalization History: Patient has not been recently hospitalized  Pt was at [facility] in 4/23 to 4/26/15 with an acute MI, said chronically noncompliant with meds, had stent to RCA. Says pt has history of 3 previous stents, and was discharged on metoprolol.  No past medical history on file.    No past surgical history on file.    Pertinent Cardiodiagnostics:     11/1/18 stress echo showed low nl EF improved with stress. EF 50 to 55%. No new WMAs. No CP     10/31/17 echo showed EF of 45 to 50%. IW AK. Grade I turner dys. Nl RVSF     6/17/16 EF was 49%. AK, DK. and HK areas. Nl MATIAS. RA and RV nl size. Nl LV turner dys.      4/25/15 EF was 45%. Cath done tooo and pt had stent to RCA.         7/14/14 EF was 55%.    HPI    Home Monitoring/Diet/Exercise:  • Daily weights: occasionally, stable 167  • Sodium restriction: fair but occasional Popeyes  • Exercise: light exercise    HF  Symptom Assessment:   • General: denies fatigue   • Shortness of breath:none noted talking on phone, in house, or with ADLs  • Functional capacity: stable, 1-2 blocks  • Orthopnea: denies, 2 pillow  • PND: denies  • GI:denies abdominal distention, ascites is not present per pt  • LE edema: absent per pt  • Cough: drenies  • Chest pain: denies  • Dizziness/lightheadedness: denies, but c/o being off balance for a long time    The patient has EVELIN. The patient is adherent to CPAP therapy: None of the time.Mild EVELIN    Medication adherence:   Medications reconciled per medication bottles. Patient reports missing 0 medication doses in the last week. Patient does use a pillbox, now pt says son is filling box    Current Outpatient Medications   Medication Sig Dispense Refill   • hydrALAZINE (APRESOLINE) 50 MG tablet Take 2 tablets by mouth 3 times daily. 90 tablet 6   • carvedilol (COREG) 25 MG tablet Take 1 tablet by mouth every 12 hours. With food 60 tablet 6   • lisinopril-hydroCHLOROthiazide (PRINZIDE,ZESTORETIC) 20-12.5 MG per tablet Take 2 tablets by mouth daily. 60 tablet 6   • amLODIPine (NORVASC) 10 MG tablet Take 1 tablet by mouth daily. 30 tablet 6   • atorvastatin (LIPITOR) 40 MG tablet Take 1 tablet by mouth at bedtime. 30 tablet 6   • aspirin 81 MG tablet Take 1 tablet by mouth daily. With food 30 tablet 6     No current facility-administered medications for this visit.        Physical Exam   Abdominal: Soft.   Per pt   Musculoskeletal:         General: No edema.      Comments: Per pt         LABS:  Recent Lab results:   Potassium (mmol/L)   Date Value   09/25/2019 4.0     BUN (mg/dL)   Date Value   09/25/2019 12     Creatinine (mg/dL)   Date Value   09/25/2019 0.66     GFR Estimate,  (no units)   Date Value   09/25/2019 >90     GFR Estimate, Non  (no units)   Date Value   09/25/2019 >90         ASSESSMENT/PLAN:    HFrEF  Compensated  and euvolemic  Etiology  ICM  -Recommend:      [facility] telephone visit pt doesn't sound sob over phone. Pt reports BP just now after meds at 140/76 with HR of 92. Pt read meds from bottles correctly, son is still filling her pill box per pt. Advised reschedule PCP appt and CPM    Hypertension  Goal BP <130/80  -BP today is not at goal    Counseling/education provided at today’s visit:   how to contact HF clinic  importance of medication adherence to improve heart function  low sodium diet  perform daily weights  call clinic if any new symptoms of shortness of breath or leg swelling  call clinic if weight gain of 3 lbs in 7 days or less  call clinic if any new sypmtoms of dizziness or light headedness      FOLLOW-UP  The patient will return to HF clinic in about 3 mo, will call pt    TIME SPENT ON VISIT  15 minutes were spent in face-to-face discussion related to the medical management of the patient.     Gloria Peters RN   Admission

## 2025-07-09 NOTE — OB PROVIDER H&P - NSHPLABSRESULTS_GEN_ALL_CORE
LABS:  GBS neg 7/5    5/16/25   /206/184/163    12/26/25  A positive, antibody negative  Rubella immune  Measles immune  Varicella immune  HepBSag nonreactive  RPR nonreactive  HCV Nonreactive  HIV nonreactive    [place]:  @36w5d MVP 5.95  6/30 EFW 3360g, 7lb7oz (97%), AC >99%, SHONNA 21, MVP 5.6  @ 35w4d S>D, vtx, post placenta, EFW: 2260g, 7lb7oz (97%), AC >99%, BPP 8/8 Anterior post diameter of right renal pelvis is 16mm, calyceal dilation vs renal cyst, left kidney normal   @ 32w0d: vertex, placenta posterior, SHONNA 19.28 (DVP 5.4), BPP 10/10, THE RIGHT RENAL PELVIS MEASURES 14.3 MM WITH CALYCEAL DILATION (UTD 2-3) THE LEFT FETAL KIDNEY APPEARS NORMAL  @ 31wks Vtx, post placenta, EFW: 2210g, 3vs50hx (98%), AC >99%, pyelectasis in right kidney, 1.1cm, BPP 8/8, Poly MVP 10.6  @ 22.6wks variable, post placenta, fetal anatomy normal   @ 21wks S=d, post placenta, nl anatomy but limited due to fetal position  @ 13w5d NT WNL

## 2025-07-09 NOTE — OB PROVIDER H&P - ASSESSMENT
19 yo  @37w0d, GBS neg, h/o asthma, with GDMA2 on metformin (non-compliant with medication), IOL    #GDMA2  -Admit to L+D  -Monitor EFM and TOCO   -IVF and labs  -Pain control PRN  -Clear liquid diet as tolerated  -FS Q4h in latent labor and Q2hr in active labor  - metformin 1000mg PO nightly  -labor induction with CRB and pitocin 21 yo  @36w6d, GBS neg, h/o asthma, with GDMA2 on metformin, IOL for poorly controlled GDMA    -Admit to L+D  -Monitor EFM and TOCO   -IVF and labs  -Pain control PRN  -Clear liquid diet as tolerated  -FS Q4h in latent labor and Q2hr in active labor  -labor induction with CRB and pitocin after midnight when patient is 37 wks

## 2025-07-09 NOTE — OB PROVIDER H&P - NSHPPHYSICALEXAM_GEN_ALL_CORE
Physical Exam  Vital Signs Last 24 Hrs  T(F): 98.2 (09 Jul 2025 21:53), Max: 98.2 (09 Jul 2025 21:53)  HR: 93 (09 Jul 2025 23:27) (93 - 100)  BP: 126/60 (09 Jul 2025 23:27) (126/60 - 129/61)  RR: 16 (09 Jul 2025 21:53) (16 - 16)    Gen: NAD, AOx3  Abdomen: soft, gravid, nontender, no palpable contractions    EFM: 140bpm/moderate/+accels  [place]: irreg  SVE: 1/0/-3  BSS: vertex

## 2025-07-09 NOTE — OB RN PATIENT PROFILE - CURRENT PREGNANCY COMPLICATIONS, OB PROFILE
Recommend OBSERVATION and continued MONITORING for progression to exudative AMD. Gestational Diabetes

## 2025-07-10 LAB
GLUCOSE BLDC GLUCOMTR-MCNC: 108 MG/DL — HIGH (ref 70–99)
GLUCOSE BLDC GLUCOMTR-MCNC: 121 MG/DL — HIGH (ref 70–99)
GLUCOSE BLDC GLUCOMTR-MCNC: 82 MG/DL — SIGNIFICANT CHANGE UP (ref 70–99)
GLUCOSE BLDC GLUCOMTR-MCNC: 82 MG/DL — SIGNIFICANT CHANGE UP (ref 70–99)
GLUCOSE BLDC GLUCOMTR-MCNC: 90 MG/DL — SIGNIFICANT CHANGE UP (ref 70–99)
GLUCOSE BLDC GLUCOMTR-MCNC: 91 MG/DL — SIGNIFICANT CHANGE UP (ref 70–99)
GLUCOSE BLDC GLUCOMTR-MCNC: 98 MG/DL — SIGNIFICANT CHANGE UP (ref 70–99)
L&D DRUG SCREEN, URINE: SIGNIFICANT CHANGE UP
PRENATAL SYPHILIS TEST: SIGNIFICANT CHANGE UP

## 2025-07-10 PROCEDURE — 58925 REMOVAL OF OVARIAN CYST(S): CPT

## 2025-07-10 PROCEDURE — 59514 CESAREAN DELIVERY ONLY: CPT | Mod: U7

## 2025-07-10 RX ORDER — MODIFIED LANOLIN 100 %
1 CREAM (GRAM) TOPICAL EVERY 6 HOURS
Refills: 0 | Status: DISCONTINUED | OUTPATIENT
Start: 2025-07-10 | End: 2025-07-14

## 2025-07-10 RX ORDER — ENOXAPARIN SODIUM 100 MG/ML
40 INJECTION SUBCUTANEOUS EVERY 24 HOURS
Refills: 0 | Status: DISCONTINUED | OUTPATIENT
Start: 2025-07-10 | End: 2025-07-14

## 2025-07-10 RX ORDER — OXYCODONE HYDROCHLORIDE 30 MG/1
5 TABLET ORAL ONCE
Refills: 0 | Status: DISCONTINUED | OUTPATIENT
Start: 2025-07-10 | End: 2025-07-14

## 2025-07-10 RX ORDER — OXYTOCIN-SODIUM CHLORIDE 0.9% IV SOLN 30 UNIT/500ML 30-0.9/5 UT/ML-%
42 SOLUTION INTRAVENOUS
Qty: 30 | Refills: 0 | Status: DISCONTINUED | OUTPATIENT
Start: 2025-07-10 | End: 2025-07-14

## 2025-07-10 RX ORDER — OXYCODONE HYDROCHLORIDE 30 MG/1
5 TABLET ORAL
Refills: 0 | Status: DISCONTINUED | OUTPATIENT
Start: 2025-07-10 | End: 2025-07-14

## 2025-07-10 RX ORDER — MAGNESIUM HYDROXIDE 400 MG/5ML
30 SUSPENSION ORAL
Refills: 0 | Status: DISCONTINUED | OUTPATIENT
Start: 2025-07-10 | End: 2025-07-14

## 2025-07-10 RX ORDER — OXYTOCIN-SODIUM CHLORIDE 0.9% IV SOLN 30 UNIT/500ML 30-0.9/5 UT/ML-%
6 SOLUTION INTRAVENOUS
Qty: 30 | Refills: 0 | Status: DISCONTINUED | OUTPATIENT
Start: 2025-07-10 | End: 2025-07-10

## 2025-07-10 RX ORDER — OXYTOCIN-SODIUM CHLORIDE 0.9% IV SOLN 30 UNIT/500ML 30-0.9/5 UT/ML-%
SOLUTION INTRAVENOUS
Qty: 30 | Refills: 0 | Status: DISCONTINUED | OUTPATIENT
Start: 2025-07-10 | End: 2025-07-10

## 2025-07-10 RX ORDER — SIMETHICONE 80 MG
80 TABLET,CHEWABLE ORAL EVERY 4 HOURS
Refills: 0 | Status: DISCONTINUED | OUTPATIENT
Start: 2025-07-10 | End: 2025-07-14

## 2025-07-10 RX ORDER — SODIUM CHLORIDE 9 G/1000ML
1000 INJECTION, SOLUTION INTRAVENOUS
Refills: 0 | Status: DISCONTINUED | OUTPATIENT
Start: 2025-07-10 | End: 2025-07-14

## 2025-07-10 RX ORDER — CALCIUM CARBONATE 750 MG/1
1 TABLET ORAL EVERY 6 HOURS
Refills: 0 | Status: DISCONTINUED | OUTPATIENT
Start: 2025-07-10 | End: 2025-07-10

## 2025-07-10 RX ORDER — ACETAMINOPHEN 500 MG/5ML
975 LIQUID (ML) ORAL
Refills: 0 | Status: DISCONTINUED | OUTPATIENT
Start: 2025-07-10 | End: 2025-07-14

## 2025-07-10 RX ORDER — FENTANYL/BUPIVACAINE/NS/PF 2MCG/ML-.1
250 PLASTIC BAG, INJECTION (ML) INJECTION
Refills: 0 | Status: DISCONTINUED | OUTPATIENT
Start: 2025-07-10 | End: 2025-07-10

## 2025-07-10 RX ORDER — DEXAMETHASONE 0.5 MG/1
4 TABLET ORAL EVERY 6 HOURS
Refills: 0 | Status: DISCONTINUED | OUTPATIENT
Start: 2025-07-10 | End: 2025-07-10

## 2025-07-10 RX ORDER — ONDANSETRON HCL/PF 4 MG/2 ML
4 VIAL (ML) INJECTION EVERY 6 HOURS
Refills: 0 | Status: DISCONTINUED | OUTPATIENT
Start: 2025-07-10 | End: 2025-07-10

## 2025-07-10 RX ORDER — DIPHENHYDRAMINE HCL 12.5MG/5ML
25 ELIXIR ORAL EVERY 6 HOURS
Refills: 0 | Status: DISCONTINUED | OUTPATIENT
Start: 2025-07-10 | End: 2025-07-14

## 2025-07-10 RX ORDER — KETOROLAC TROMETHAMINE 30 MG/ML
30 INJECTION, SOLUTION INTRAMUSCULAR; INTRAVENOUS EVERY 6 HOURS
Refills: 0 | Status: DISCONTINUED | OUTPATIENT
Start: 2025-07-10 | End: 2025-07-11

## 2025-07-10 RX ORDER — NALOXONE HYDROCHLORIDE 0.4 MG/ML
0.1 INJECTION, SOLUTION INTRAMUSCULAR; INTRAVENOUS; SUBCUTANEOUS
Refills: 0 | Status: DISCONTINUED | OUTPATIENT
Start: 2025-07-10 | End: 2025-07-10

## 2025-07-10 RX ORDER — SENNA 187 MG
2 TABLET ORAL AT BEDTIME
Refills: 0 | Status: DISCONTINUED | OUTPATIENT
Start: 2025-07-10 | End: 2025-07-14

## 2025-07-10 RX ORDER — IBUPROFEN 200 MG
600 TABLET ORAL EVERY 6 HOURS
Refills: 0 | Status: COMPLETED | OUTPATIENT
Start: 2025-07-10 | End: 2026-06-08

## 2025-07-10 RX ADMIN — OXYTOCIN-SODIUM CHLORIDE 0.9% IV SOLN 30 UNIT/500ML 42 MILLIUNIT(S)/MIN: 30-0.9/5 SOLUTION at 23:50

## 2025-07-10 RX ADMIN — CALCIUM CARBONATE 1 TABLET(S): 750 TABLET ORAL at 18:13

## 2025-07-10 RX ADMIN — CALCIUM CARBONATE 1 TABLET(S): 750 TABLET ORAL at 06:00

## 2025-07-10 RX ADMIN — OXYTOCIN-SODIUM CHLORIDE 0.9% IV SOLN 30 UNIT/500ML 2 MILLIUNIT(S)/MIN: 30-0.9/5 SOLUTION at 04:00

## 2025-07-10 RX ADMIN — CALCIUM CARBONATE 1 TABLET(S): 750 TABLET ORAL at 20:56

## 2025-07-10 NOTE — BRIEF OPERATIVE NOTE - OPERATION/FINDINGS
Pfannenstiel incision. Low transverse uterine incision. Normal appearing uterus, bilateral fallopian tubes, and ovaries. Left paratubal cyst noted and removed. Viable female infant delivered. Good hemostasis noted at end of procedure Pfannenstiel incision. Low transverse uterine incision. Normal appearing uterus, Left adnexal l cyst noted and removed. Viable female infant delivered. Good hemostasis noted at end of procedure

## 2025-07-10 NOTE — PROGRESS NOTE ADULT - ASSESSMENT
A/P:  20y  at 37w, GBS negative, IOL for GDMA2, poor compliance, suspected LGA. H/o asthma not on medications.    #T2DM  - on metformin 1000 mg nightly for GDMA2    #Labor management   -cont EFM/toco  -clear liquid diet, IVF  -pain management prn/with epidural  - reevaluate

## 2025-07-10 NOTE — PROGRESS NOTE ADULT - SUBJECTIVE AND OBJECTIVE BOX
PGY 4 Note    Patient seen at bedside for evaluation of labor progression. Pt was having significant pain, however now improved after CRB fell out at 5:30am. Wanted epidural, now wants to hold off.   Pitocin was paused at 5:20 due to patient pain.     T(F): --  HR: 75 (05:35)  BP: 121/79 (05:35)  RR: 16 (05:35)    EFM: 140/mod/+accels  TOCO: q3m  SVE: 4/50/-3    Labs:                        9.2    14.96 )-----------( 332      ( 2025 23:10 )             29.5           ABO RH Interpretation: A POS (25 @ 23:10)    Urinalysis Basic - ( 2025 23:10 )    Color: Yellow / Appearance: Clear / S.005 / pH: x  Gluc: x / Ketone: x  / Bili: Negative / Urobili: 0.2 mg/dL   Blood: x / Protein: Negative mg/dL / Nitrite: Negative   Leuk Esterase: Negative / RBC: x / WBC x   Sq Epi: x / Non Sq Epi: x / Bacteria: x          Meds: calcium carbonate    500 mG (Tums) Chewable 1 Tablet(s) Chew every 6 hours  famotidine Injectable 20 milliGRAM(s) IV Push once  fentanyl (2 MICROgram(s)/mL) + bupivacaine 0.0625%  in 0.9% Sodium Chloride PCEA 250 milliLiter(s) Epidural PCA Continuous  lactated ringers. 1000 milliLiter(s) IV Continuous <Continuous>  lidocaine 1% (Preservative-free) Injectable 30 milliLiter(s) Local Injection once  oxytocin Infusion 167 milliUNIT(s)/Min IV Continuous <Continuous>  oxytocin Infusion.  milliUNIT(s)/Min IV Continuous <Continuous>

## 2025-07-10 NOTE — CHART NOTE - NSCHARTNOTEFT_GEN_A_CORE
PGY 4 Note    Pt now SVE unchanged at 6cm since 1530 (6 hours without change) despite being ROM on pitocin. Discussed with patient that this is abnormal labor course, we recommend primary LTCS to ensure safe delivery of the fetus. R/B/A to c/s discussed, including bleeding, infection, damage to surrounding organs. Pt voiced understanding and consent signed.    Anesthesia notified  ancef and pinky  on call to OR

## 2025-07-10 NOTE — BRIEF OPERATIVE NOTE - NSICDXBRIEFPREOP_GEN_ALL_CORE_FT
PRE-OP DIAGNOSIS:  Arrest of dilation, delivered, current hospitalization 10-Jul-2025 23:56:25  Jeannette Mehta

## 2025-07-10 NOTE — PROGRESS NOTE ADULT - SUBJECTIVE AND OBJECTIVE BOX
PGY 1 Note    Patient seen at bedside for evaluation of labor progression. Patient is comfortable, has a history of sciatica making it difficult to bend legs for examination, declining epidural at this time.       HR: 85 (08:41)  BP: 122/56 (08:41)  RR: 16 (05:35)    EFM: 130/mod/+acels, early decels  TOCO: q2-3  SVE: 4/50/-2, AROM, clear     Labs:                        9.2    14.96 )-----------( 332      ( 2025 23:10 )             29.5           ABO RH Interpretation: A POS (25 @ 23:10)    Urinalysis Basic - ( 2025 23:10 )    Color: Yellow / Appearance: Clear / S.005 / pH: x  Gluc: x / Ketone: x  / Bili: Negative / Urobili: 0.2 mg/dL   Blood: x / Protein: Negative mg/dL / Nitrite: Negative   Leuk Esterase: Negative / RBC: x / WBC x   Sq Epi: x / Non Sq Epi: x / Bacteria: x      Meds: calcium carbonate    500 mG (Tums) Chewable 1 Tablet(s) Chew every 6 hours  famotidine Injectable 20 milliGRAM(s) IV Push once  famotidine Injectable 20 milliGRAM(s) IV Push once  fentanyl (2 MICROgram(s)/mL) + bupivacaine 0.0625%  in 0.9% Sodium Chloride PCEA 250 milliLiter(s) Epidural PCA Continuous  lactated ringers. 1000 milliLiter(s) IV Continuous <Continuous>  lidocaine 1% (Preservative-free) Injectable 30 milliLiter(s) Local Injection once  oxytocin Infusion 167 milliUNIT(s)/Min IV Continuous <Continuous>  oxytocin Infusion. 6 milliUNIT(s)/Min IV Continuous <Continuous>    1:45	CRB 80/80  4:00	pit started  5:20	pit off (pain)  5:30	CRB out, 4/50/-3  5:40	pit on   7:36	pit at 6 x 6, currently at 12  9:28	4/50/-2, AROM clear

## 2025-07-10 NOTE — PROGRESS NOTE ADULT - ASSESSMENT
A/P:  20y  @37w0d, h/o asthma, IOL for poorly controlled GDMA2, s/p CRB    -cont EFM/toco  -clear liquid diet, IVF  -pain management prn  -continue pitocin  -reevaluate   A/P:  20y  @37w0d, h/o asthma, IOL for poorly controlled GDMA2, s/p CRB    -cont EFM/toco  -clear liquid diet, IVF  -pain management prn  -continue pitocin  -FS q2hr active labor, q4hr latent labor  -reevaluate

## 2025-07-10 NOTE — OB PROVIDER DELIVERY SUMMARY - NSSELHIDDEN_OBGYN_ALL_OB_FT
[[email or web address]_OB_FT:[id number]],[[email or web address]_ALL_OB_FT:[id number]],[NS_DeliveryRN_OBGYN_ALL_OB_FT:[id number]=]

## 2025-07-10 NOTE — PROCEDURE NOTE - ADDITIONAL PROCEDURE DETAILS
Thorough discussion of patient's history, as indicated above.  Discussed risks of spinal/epidural, including PDPH, inadequate analgesia occasionally requiring epidural catheter replacement/ general anesthesia, bleeding, infection and spinal cord injury. hypotension and nausea. Patient expressed understanding of these risks, signed informed consent and wishes to proceed with spinal/epidural    Patient sitting. Lumbar epidural performed at L3-4. Standard ASA monitors including FHR. Sterile gloves, Chloro-prep. 1% lidocaine for local infiltration. 17g Touhy. VANIA with air at 5 cm. 27g Justice used to make a dural puncture with + CSF. Justice needle removed and epidural catheter threaded easily. Touhy needle removed. Catheter secured in place at 10  cm. Negative aspiration. Test dose consisting of 3ml 1.5% lidocaine with epinephrine 1:200k was negative. 2cc 1.5% lidocaine with epinephrine 1:200k. 10cc .125% Bupivacaine in two divided doses of 5cc. Patient tolerated procedure and was hemodynamically stable throughout. T10 level bilaterally.     Post Procedure Patient evaluated at bedside.  Hemodynamically stable, degree of motor block appropriate for epidural medication administered. Pain is well controlled bilaterally. Patient is aware that the anesthesia team is available 24/7, in case she needs more pain medication or has any other anesthesia-related needs or questions.     .0625% Bupivacaine +2ucg/cc Fentanyl epidural PIEB Intermittent Bolus 9ml, Bolus interval 45 min, Next bolus 30 min. PCEA 8ml, PCEA Lockout 10 min, 1 hour limit 37ml

## 2025-07-10 NOTE — OB RN DELIVERY SUMMARY - NSSELHIDDEN_OBGYN_ALL_OB_FT
[NS_[email or web address]_ALL_OB_FT:[id number]],[NS_DeliveryAssist1_OBGYN_ALL_OB_FT:[id number]=],[NS_DeliveryRN_OBGYN_ALL_OB_FT:[id number]=]

## 2025-07-10 NOTE — BRIEF OPERATIVE NOTE - NSICDXBRIEFPOSTOP_GEN_ALL_CORE_FT
POST-OP DIAGNOSIS:  Arrest of dilation, delivered, current hospitalization 10-Jul-2025 23:56:30  Jeannette Mehta

## 2025-07-11 ENCOUNTER — APPOINTMENT (OUTPATIENT)
Dept: MATERNAL FETAL MEDICINE | Facility: CLINIC | Age: 20
End: 2025-07-11

## 2025-07-11 ENCOUNTER — APPOINTMENT (OUTPATIENT)
Dept: ANTEPARTUM | Facility: CLINIC | Age: 20
End: 2025-07-11

## 2025-07-11 ENCOUNTER — RESULT REVIEW (OUTPATIENT)
Age: 20
End: 2025-07-11

## 2025-07-11 LAB
BASOPHILS # BLD AUTO: 0.03 K/UL — SIGNIFICANT CHANGE UP (ref 0–0.2)
BASOPHILS NFR BLD AUTO: 0.2 % — SIGNIFICANT CHANGE UP (ref 0–1)
EOSINOPHIL # BLD AUTO: 0.03 K/UL — SIGNIFICANT CHANGE UP (ref 0–0.7)
EOSINOPHIL NFR BLD AUTO: 0.2 % — SIGNIFICANT CHANGE UP (ref 0–8)
HCT VFR BLD CALC: 26.8 % — LOW (ref 37–47)
HGB BLD-MCNC: 8.2 G/DL — LOW (ref 12–16)
HIV 1 & 2 AB SERPL IA.RAPID: SIGNIFICANT CHANGE UP
IMM GRANULOCYTES NFR BLD AUTO: 1.5 % — HIGH (ref 0.1–0.3)
LYMPHOCYTES # BLD AUTO: 14 % — LOW (ref 20.5–51.1)
LYMPHOCYTES # BLD AUTO: 2.17 K/UL — SIGNIFICANT CHANGE UP (ref 1.2–3.4)
MCHC RBC-ENTMCNC: 24 PG — LOW (ref 27–31)
MCHC RBC-ENTMCNC: 30.6 G/DL — LOW (ref 32–37)
MCV RBC AUTO: 78.6 FL — LOW (ref 81–99)
MONOCYTES # BLD AUTO: 1.53 K/UL — HIGH (ref 0.1–0.6)
MONOCYTES NFR BLD AUTO: 9.9 % — HIGH (ref 1.7–9.3)
NEUTROPHILS # BLD AUTO: 11.51 K/UL — HIGH (ref 1.4–6.5)
NEUTROPHILS NFR BLD AUTO: 74.2 % — SIGNIFICANT CHANGE UP (ref 42.2–75.2)
NRBC BLD AUTO-RTO: 0 /100 WBCS — SIGNIFICANT CHANGE UP (ref 0–0)
PLATELET # BLD AUTO: 331 K/UL — SIGNIFICANT CHANGE UP (ref 130–400)
PMV BLD: 11 FL — HIGH (ref 7.4–10.4)
RBC # BLD: 3.41 M/UL — LOW (ref 4.2–5.4)
RBC # FLD: 15.7 % — HIGH (ref 11.5–14.5)
WBC # BLD: 15.5 K/UL — HIGH (ref 4.8–10.8)
WBC # FLD AUTO: 15.5 K/UL — HIGH (ref 4.8–10.8)

## 2025-07-11 PROCEDURE — 88302 TISSUE EXAM BY PATHOLOGIST: CPT | Mod: 26

## 2025-07-11 PROCEDURE — 88307 TISSUE EXAM BY PATHOLOGIST: CPT | Mod: 26

## 2025-07-11 RX ORDER — HYDROMORPHONE/SOD CHLOR,ISO/PF 2 MG/10 ML
30 SYRINGE (ML) INJECTION
Refills: 0 | Status: DISCONTINUED | OUTPATIENT
Start: 2025-07-11 | End: 2025-07-11

## 2025-07-11 RX ORDER — ALBUTEROL SULFATE 2.5 MG/3ML
2 VIAL, NEBULIZER (ML) INHALATION EVERY 6 HOURS
Refills: 0 | Status: DISCONTINUED | OUTPATIENT
Start: 2025-07-11 | End: 2025-07-14

## 2025-07-11 RX ORDER — NALOXONE HYDROCHLORIDE 0.4 MG/ML
0.1 INJECTION, SOLUTION INTRAMUSCULAR; INTRAVENOUS; SUBCUTANEOUS
Refills: 0 | Status: DISCONTINUED | OUTPATIENT
Start: 2025-07-11 | End: 2025-07-14

## 2025-07-11 RX ORDER — HYDROMORPHONE/SOD CHLOR,ISO/PF 2 MG/10 ML
1 SYRINGE (ML) INJECTION
Refills: 0 | Status: DISCONTINUED | OUTPATIENT
Start: 2025-07-11 | End: 2025-07-14

## 2025-07-11 RX ORDER — IBUPROFEN 200 MG
600 TABLET ORAL EVERY 6 HOURS
Refills: 0 | Status: DISCONTINUED | OUTPATIENT
Start: 2025-07-11 | End: 2025-07-14

## 2025-07-11 RX ORDER — ONDANSETRON HCL/PF 4 MG/2 ML
4 VIAL (ML) INJECTION EVERY 6 HOURS
Refills: 0 | Status: DISCONTINUED | OUTPATIENT
Start: 2025-07-11 | End: 2025-07-14

## 2025-07-11 RX ORDER — SODIUM CHLORIDE 9 G/1000ML
500 INJECTION, SOLUTION INTRAVENOUS ONCE
Refills: 0 | Status: COMPLETED | OUTPATIENT
Start: 2025-07-11 | End: 2025-07-11

## 2025-07-11 RX ADMIN — ENOXAPARIN SODIUM 40 MILLIGRAM(S): 100 INJECTION SUBCUTANEOUS at 12:17

## 2025-07-11 RX ADMIN — KETOROLAC TROMETHAMINE 30 MILLIGRAM(S): 30 INJECTION, SOLUTION INTRAMUSCULAR; INTRAVENOUS at 06:49

## 2025-07-11 RX ADMIN — KETOROLAC TROMETHAMINE 30 MILLIGRAM(S): 30 INJECTION, SOLUTION INTRAMUSCULAR; INTRAVENOUS at 00:36

## 2025-07-11 RX ADMIN — Medication 1 MILLIGRAM(S): at 02:33

## 2025-07-11 RX ADMIN — Medication 80 MILLIGRAM(S): at 22:17

## 2025-07-11 RX ADMIN — Medication 2 PUFF(S): at 12:16

## 2025-07-11 RX ADMIN — Medication 975 MILLIGRAM(S): at 15:49

## 2025-07-11 RX ADMIN — Medication 975 MILLIGRAM(S): at 09:06

## 2025-07-11 RX ADMIN — Medication 1 MILLIGRAM(S): at 01:56

## 2025-07-11 RX ADMIN — Medication 975 MILLIGRAM(S): at 15:32

## 2025-07-11 RX ADMIN — Medication 975 MILLIGRAM(S): at 09:30

## 2025-07-11 RX ADMIN — Medication 600 MILLIGRAM(S): at 18:12

## 2025-07-11 RX ADMIN — Medication 975 MILLIGRAM(S): at 22:22

## 2025-07-11 RX ADMIN — Medication 1 MILLIGRAM(S): at 03:03

## 2025-07-11 RX ADMIN — Medication 1 MILLIGRAM(S): at 01:26

## 2025-07-11 RX ADMIN — Medication 600 MILLIGRAM(S): at 18:49

## 2025-07-11 RX ADMIN — Medication 80 MILLIGRAM(S): at 18:12

## 2025-07-11 RX ADMIN — SODIUM CHLORIDE 1000 MILLILITER(S): 9 INJECTION, SOLUTION INTRAVENOUS at 12:07

## 2025-07-11 RX ADMIN — Medication 975 MILLIGRAM(S): at 04:49

## 2025-07-11 RX ADMIN — KETOROLAC TROMETHAMINE 30 MILLIGRAM(S): 30 INJECTION, SOLUTION INTRAMUSCULAR; INTRAVENOUS at 00:06

## 2025-07-11 RX ADMIN — Medication 975 MILLIGRAM(S): at 22:16

## 2025-07-11 RX ADMIN — Medication 80 MILLIGRAM(S): at 09:06

## 2025-07-11 RX ADMIN — KETOROLAC TROMETHAMINE 30 MILLIGRAM(S): 30 INJECTION, SOLUTION INTRAMUSCULAR; INTRAVENOUS at 12:18

## 2025-07-11 RX ADMIN — KETOROLAC TROMETHAMINE 30 MILLIGRAM(S): 30 INJECTION, SOLUTION INTRAMUSCULAR; INTRAVENOUS at 13:30

## 2025-07-11 NOTE — PROGRESS NOTE ADULT - SUBJECTIVE AND OBJECTIVE BOX
PGY 3 Note:    Pt seen and evaluated at bedside. Pt doing well, pain well controlled, no acute complaints.  Denies dizziness/lightheadedness, fever, chills, nausea/vomiting, dysuria, LE pain, or heavy vaginal bleeding. Denies headache, chest pain, SOB, or RUQ/epigastric pain. Patient not yet ambulating or passing flatus. Tolerating clears. Dwyer in place, draining clear urine.      Physical Exam  Vital Signs Last 24 Hrs  T(C): 36.7 (11 Jul 2025 03:05), Max: 36.8 (10 Jul 2025 21:44)  T(F): 98 (11 Jul 2025 03:05), Max: 98.24 (10 Jul 2025 21:44)  HR: 86 (11 Jul 2025 06:05) (66 - 100)  BP: 131/71 (11 Jul 2025 03:05) (104/51 - 143/68)  BP(mean): --  RR: 19 (11 Jul 2025 03:05) (18 - 19)  SpO2: 100% (11 Jul 2025 06:05) (95% - 100%)    Parameters below as of 11 Jul 2025 03:05  Patient On (Oxygen Delivery Method): room air      Gen: AAOx3, NAD  Heart: RRR, S1S2+  Lungs: CTA B/L, no r/r/w   Fundus: firm, below umbilicus   Wound: Pfannenstiel incision, sterile dressing in place c/d/i. No surrounding erythema or edema.   Abd: Soft, nontender, nondistended, BS+  Lochia: minimal   Ext: No calf tenderness, no swelling    Labs:                        9.2    14.96 )-----------( 332      ( 09 Jul 2025 23:10 )             29.5                         9.5    17.54 )-----------( 331      ( 03 Jul 2025 17:17 )             29.9        MEDICATIONS  (STANDING):  acetaminophen     Tablet .. 975 milliGRAM(s) Oral <User Schedule>  diphtheria/tetanus/pertussis (acellular) Vaccine (Adacel) 0.5 milliLiter(s) IntraMuscular once  enoxaparin Injectable 40 milliGRAM(s) SubCutaneous every 24 hours  ibuprofen  Tablet. 600 milliGRAM(s) Oral every 6 hours  ketorolac   Injectable 30 milliGRAM(s) IV Push every 6 hours  lactated ringers. 1000 milliLiter(s) (125 mL/Hr) IV Continuous <Continuous>  oxytocin Infusion 42 milliUNIT(s)/Min (42 mL/Hr) IV Continuous <Continuous>  senna 2 Tablet(s) Oral at bedtime    MEDICATIONS  (PRN):  diphenhydrAMINE 25 milliGRAM(s) Oral every 6 hours PRN Pruritus  HYDROmorphone  Injectable 1 milliGRAM(s) IV Push every 5 minutes PRN Severe Pain (7 - 10)  lanolin Ointment 1 Application(s) Topical every 6 hours PRN Sore Nipples  magnesium hydroxide Suspension 30 milliLiter(s) Oral two times a day PRN Constipation  naloxone Injectable 0.1 milliGRAM(s) IV Push every 3 minutes PRN For ANY of the following changes in patient status:  A. RR LESS THAN 10 breaths per minute, B. Oxygen saturation LESS THAN 90%, C. Sedation score of 6  ondansetron Injectable 4 milliGRAM(s) IV Push every 6 hours PRN Nausea  oxyCODONE    IR 5 milliGRAM(s) Oral every 3 hours PRN Moderate to Severe Pain (4-10)  oxyCODONE    IR 5 milliGRAM(s) Oral once PRN Moderate to Severe Pain (4-10)  simethicone 80 milliGRAM(s) Chew every 4 hours PRN Gas

## 2025-07-11 NOTE — OB RN INTRAOPERATIVE NOTE - NSSELHIDDEN_OBGYN_ALL_OB_FT
[NS_[email or web address]_OB_FT:[id number]],[NS_DeliveryAssist1_OBGYN_ALL_OB_FT:[id number]],[NS_DeliveryRN_OBGYN_ALL_OB_FT:[id number]=]

## 2025-07-11 NOTE — PROGRESS NOTE ADULT - ASSESSMENT
A/P: 19yo now P1 S/P primary C/S for arrest of dilation under general anesthesia, POD 0, QBL 535cc, GDMA2, h/o asthma, recovering well   - pain management with PO pain meds   - monitor vitals/bleeding   - encourage incentive spirometry   - ambulation/PO hydration  - advance diet as tolerated   - simethicone  - SCDs/lovenox for DVT prophylaxis   - f/u 1100 labs   - routine postop care   - UO adequate, broussard till PM

## 2025-07-12 PROCEDURE — 99231 SBSQ HOSP IP/OBS SF/LOW 25: CPT | Mod: 24

## 2025-07-12 RX ORDER — IRON SUCROSE 20 MG/ML
200 INJECTION, SOLUTION INTRAVENOUS ONCE
Refills: 0 | Status: COMPLETED | OUTPATIENT
Start: 2025-07-12 | End: 2025-07-12

## 2025-07-12 RX ADMIN — Medication 975 MILLIGRAM(S): at 15:31

## 2025-07-12 RX ADMIN — Medication 80 MILLIGRAM(S): at 09:56

## 2025-07-12 RX ADMIN — Medication 975 MILLIGRAM(S): at 22:01

## 2025-07-12 RX ADMIN — Medication 975 MILLIGRAM(S): at 04:00

## 2025-07-12 RX ADMIN — IRON SUCROSE 100 MILLIGRAM(S): 20 INJECTION, SOLUTION INTRAVENOUS at 10:22

## 2025-07-12 RX ADMIN — Medication 2 TABLET(S): at 21:31

## 2025-07-12 RX ADMIN — Medication 600 MILLIGRAM(S): at 01:25

## 2025-07-12 RX ADMIN — Medication 600 MILLIGRAM(S): at 01:17

## 2025-07-12 RX ADMIN — Medication 600 MILLIGRAM(S): at 12:54

## 2025-07-12 RX ADMIN — Medication 975 MILLIGRAM(S): at 03:50

## 2025-07-12 RX ADMIN — Medication 600 MILLIGRAM(S): at 17:59

## 2025-07-12 RX ADMIN — ENOXAPARIN SODIUM 40 MILLIGRAM(S): 100 INJECTION SUBCUTANEOUS at 12:54

## 2025-07-12 RX ADMIN — Medication 600 MILLIGRAM(S): at 07:16

## 2025-07-12 RX ADMIN — Medication 975 MILLIGRAM(S): at 09:56

## 2025-07-12 RX ADMIN — Medication 80 MILLIGRAM(S): at 15:31

## 2025-07-12 RX ADMIN — Medication 975 MILLIGRAM(S): at 21:31

## 2025-07-12 NOTE — PROGRESS NOTE ADULT - SUBJECTIVE AND OBJECTIVE BOX
PGY 1 Note:    Pt seen and evaluated at bedside. Pt doing well, pain well controlled, no acute complaints.  Denies dizziness/lightheadedness, fever, chills, nausea/vomiting, dysuria, LE pain, or heavy vaginal bleeding. Denies headache, chest pain, SOB, or RUQ/epigastric pain. Patient has passed flatus, is ambulating OOB without dizziness. Tolerating PO water. Voiding without dysuria.     Physical Exam  Vital Signs Last 24 Hrs  T(C): 36.7 (11 Jul 2025 03:05), Max: 36.8 (10 Jul 2025 21:44)  T(F): 98 (11 Jul 2025 03:05), Max: 98.24 (10 Jul 2025 21:44)  HR: 86 (11 Jul 2025 06:05) (66 - 100)  BP: 131/71 (11 Jul 2025 03:05) (104/51 - 143/68)  RR: 19 (11 Jul 2025 03:05) (18 - 19)  SpO2: 100% (11 Jul 2025 06:05) (95% - 100%)    Gen: AAOx3, NAD  Fundus: firm, below umbilicus   Wound: Pfannenstiel incision, ABD dressing in place c/d/i. No surrounding erythema or edema.   Abd: Soft, nontender, nondistended  Lochia: minimal   Ext: No calf tenderness, no swelling    Labs:             9.2    14.96 )-----------( 332      ( 09 Jul 2025 23:10 )             29.5                         9.5    17.54 )-----------( 331      ( 03 Jul 2025 17:17 )             29.9        MEDICATIONS  (STANDING):  acetaminophen     Tablet .. 975 milliGRAM(s) Oral <User Schedule>  diphtheria/tetanus/pertussis (acellular) Vaccine (Adacel) 0.5 milliLiter(s) IntraMuscular once  enoxaparin Injectable 40 milliGRAM(s) SubCutaneous every 24 hours  ibuprofen  Tablet. 600 milliGRAM(s) Oral every 6 hours  ketorolac   Injectable 30 milliGRAM(s) IV Push every 6 hours  lactated ringers. 1000 milliLiter(s) (125 mL/Hr) IV Continuous <Continuous>  oxytocin Infusion 42 milliUNIT(s)/Min (42 mL/Hr) IV Continuous <Continuous>  senna 2 Tablet(s) Oral at bedtime    MEDICATIONS  (PRN):  diphenhydrAMINE 25 milliGRAM(s) Oral every 6 hours PRN Pruritus  HYDROmorphone  Injectable 1 milliGRAM(s) IV Push every 5 minutes PRN Severe Pain (7 - 10)  lanolin Ointment 1 Application(s) Topical every 6 hours PRN Sore Nipples  magnesium hydroxide Suspension 30 milliLiter(s) Oral two times a day PRN Constipation  naloxone Injectable 0.1 milliGRAM(s) IV Push every 3 minutes PRN For ANY of the following changes in patient status:  A. RR LESS THAN 10 breaths per minute, B. Oxygen saturation LESS THAN 90%, C. Sedation score of 6  ondansetron Injectable 4 milliGRAM(s) IV Push every 6 hours PRN Nausea  oxyCODONE    IR 5 milliGRAM(s) Oral every 3 hours PRN Moderate to Severe Pain (4-10)  oxyCODONE    IR 5 milliGRAM(s) Oral once PRN Moderate to Severe Pain (4-10)  simethicone 80 milliGRAM(s) Chew every 4 hours PRN Gas

## 2025-07-12 NOTE — PROGRESS NOTE ADULT - ASSESSMENT
A/P: 19yo now P1 S/P primary C/S for arrest of dilation under general anesthesia, POD 2, QBL 535cc, GDMA2, h/o asthma, recovering well     - pain management with PO pain meds   - monitor vitals/bleeding   - encourage incentive spirometry   - ambulation/PO hydration  - advance diet as tolerated   - simethicone  - SCDs, lovenox ordered for DVT prophylaxis   - voiding without difficulty or dysuria  - Hgb 9.2->8.2, venofer ordered  - routine postop care     Dr. Root and Dr. Arriaza aware

## 2025-07-13 LAB
FLUAV AG NPH QL: SIGNIFICANT CHANGE UP
FLUBV AG NPH QL: SIGNIFICANT CHANGE UP
RSV RNA NPH QL NAA+NON-PROBE: SIGNIFICANT CHANGE UP
SARS-COV-2 RNA SPEC QL NAA+PROBE: SIGNIFICANT CHANGE UP
SOURCE RESPIRATORY: SIGNIFICANT CHANGE UP

## 2025-07-13 PROCEDURE — 99238 HOSP IP/OBS DSCHRG MGMT 30/<: CPT | Mod: 24

## 2025-07-13 RX ORDER — DEXTROMETHORPHAN HBR, GUAIFENESIN 200 MG/10ML
100 LIQUID ORAL EVERY 6 HOURS
Refills: 0 | Status: DISCONTINUED | OUTPATIENT
Start: 2025-07-13 | End: 2025-07-14

## 2025-07-13 RX ORDER — DEXTROMETHORPHAN HBR, GUAIFENESIN 20; 200 MG/10ML; MG/10ML
10 SOLUTION ORAL ONCE
Refills: 0 | Status: COMPLETED | OUTPATIENT
Start: 2025-07-13 | End: 2025-07-13

## 2025-07-13 RX ADMIN — Medication 975 MILLIGRAM(S): at 15:51

## 2025-07-13 RX ADMIN — DEXTROMETHORPHAN HBR, GUAIFENESIN 100 MILLIGRAM(S): 200 LIQUID ORAL at 10:46

## 2025-07-13 RX ADMIN — Medication 600 MILLIGRAM(S): at 00:52

## 2025-07-13 RX ADMIN — Medication 975 MILLIGRAM(S): at 03:42

## 2025-07-13 RX ADMIN — Medication 600 MILLIGRAM(S): at 06:34

## 2025-07-13 RX ADMIN — Medication 975 MILLIGRAM(S): at 03:12

## 2025-07-13 RX ADMIN — Medication 80 MILLIGRAM(S): at 15:50

## 2025-07-13 RX ADMIN — Medication 975 MILLIGRAM(S): at 22:07

## 2025-07-13 RX ADMIN — DEXTROMETHORPHAN HBR, GUAIFENESIN 10 MILLILITER(S): 20; 200 SOLUTION ORAL at 03:12

## 2025-07-13 RX ADMIN — ENOXAPARIN SODIUM 40 MILLIGRAM(S): 100 INJECTION SUBCUTANEOUS at 11:59

## 2025-07-13 RX ADMIN — Medication 600 MILLIGRAM(S): at 06:04

## 2025-07-13 RX ADMIN — DEXTROMETHORPHAN HBR, GUAIFENESIN 100 MILLIGRAM(S): 200 LIQUID ORAL at 20:22

## 2025-07-13 RX ADMIN — Medication 600 MILLIGRAM(S): at 00:22

## 2025-07-13 RX ADMIN — Medication 975 MILLIGRAM(S): at 21:37

## 2025-07-13 RX ADMIN — Medication 600 MILLIGRAM(S): at 11:59

## 2025-07-13 RX ADMIN — Medication 975 MILLIGRAM(S): at 09:06

## 2025-07-13 RX ADMIN — Medication 2 TABLET(S): at 21:37

## 2025-07-13 RX ADMIN — Medication 80 MILLIGRAM(S): at 00:22

## 2025-07-13 RX ADMIN — Medication 600 MILLIGRAM(S): at 18:24

## 2025-07-13 NOTE — PROGRESS NOTE ADULT - SUBJECTIVE AND OBJECTIVE BOX
CEE GUSTAFSON  20y  Female    PGY1 Note:  Patient seen and examined bedside. No overnight events. Patient has a cough which started since yesterday, feels congested. Denies heavy vaginal bleeding. Pain well controlled. Ambulating without difficulty. Tolerating diet, voiding, passing flatus, no BM. Not breastfeeding.     Physical Exam  Vital Signs Last 24 Hrs  T(C): 36.5 (13 Jul 2025 07:58), Max: 36.9 (12 Jul 2025 15:48)  T(F): 97.7 (13 Jul 2025 07:58), Max: 98.4 (12 Jul 2025 15:48)  HR: 72 (13 Jul 2025 07:58) (70 - 107)  BP: 112/70 (13 Jul 2025 07:58) (105/64 - 128/85  RR: 18 (13 Jul 2025 07:58) (18 - 19)  SpO2: 98% (13 Jul 2025 07:58) (96% - 98%)    Parameters below as of 13 Jul 2025 07:58  Patient On (Oxygen Delivery Method): room air        Gen: NAD, sitting comfortably  CV: RRR. No murmurs gallops or rubs.  Pulm: CTAB. No wheezes or rales.  Ext: No calf tenderness, no swelling.   Abd: Nondistended, soft, nontender, BS+, fundus firm, and below umbilicus.   Lochia: Minimal rubra  Wound: Pfannenstiel skin incision clean, dry intact. Steris in place. No surrounding edema or erythema.        PAST MEDICAL & SURGICAL HISTORY:  Asthma      No significant past surgical history          Diet:    Labs:                        8.2    15.50 )-----------( 331      ( 11 Jul 2025 11:21 )             26.8                         9.2    14.96 )-----------( 332      ( 09 Jul 2025 23:10 )             29.5          acetaminophen     Tablet .. 975 milliGRAM(s) Oral <User Schedule>  albuterol    90 MICROgram(s) HFA Inhaler 2 Puff(s) Inhalation every 6 hours PRN  diphenhydrAMINE 25 milliGRAM(s) Oral every 6 hours PRN  diphtheria/tetanus/pertussis (acellular) Vaccine (Adacel) 0.5 milliLiter(s) IntraMuscular once  enoxaparin Injectable 40 milliGRAM(s) SubCutaneous every 24 hours  guaiFENesin Oral Liquid (Sugar-Free) 100 milliGRAM(s) Oral every 6 hours PRN  HYDROmorphone  Injectable 1 milliGRAM(s) IV Push every 5 minutes PRN  ibuprofen  Tablet. 600 milliGRAM(s) Oral every 6 hours  lactated ringers. 1000 milliLiter(s) IV Continuous <Continuous>  lanolin Ointment 1 Application(s) Topical every 6 hours PRN  magnesium hydroxide Suspension 30 milliLiter(s) Oral two times a day PRN  naloxone Injectable 0.1 milliGRAM(s) IV Push every 3 minutes PRN  ondansetron Injectable 4 milliGRAM(s) IV Push every 6 hours PRN  oxyCODONE    IR 5 milliGRAM(s) Oral every 3 hours PRN  oxyCODONE    IR 5 milliGRAM(s) Oral once PRN  oxytocin Infusion 42 milliUNIT(s)/Min IV Continuous <Continuous>  senna 2 Tablet(s) Oral at bedtime  simethicone 80 milliGRAM(s) Chew every 4 hours PRN        CEE GUSTAFSON  20y  Female    PGY1 Note:  Patient seen and examined bedside. No overnight events. Patient has a cough which started since yesterday, feels congested. Denies heavy vaginal bleeding. Pain well controlled. Ambulating without difficulty. Tolerating diet, voiding, passing flatus, no BM. Not breastfeeding.     Physical Exam  Vital Signs Last 24 Hrs  T(C): 36.5 (13 Jul 2025 07:58), Max: 36.9 (12 Jul 2025 15:48)  T(F): 97.7 (13 Jul 2025 07:58), Max: 98.4 (12 Jul 2025 15:48)  HR: 72 (13 Jul 2025 07:58) (70 - 107)  BP: 112/70 (13 Jul 2025 07:58) (105/64 - 128/85  RR: 18 (13 Jul 2025 07:58) (18 - 19)  SpO2: 98% (13 Jul 2025 07:58) (96% - 98%)    Parameters below as of 13 Jul 2025 07:58  Patient On (Oxygen Delivery Method): room air        Gen: NAD, sitting comfortably  CV: RRR. No murmurs gallops or rubs.  Pulm: CTAB. No wheezes or rales.  Ext: No calf tenderness, no swelling.   Abd: Nondistended, soft, nontender, BS+, fundus firm, and below umbilicus.   Lochia: Minimal rubra  Wound: Pfannenstiel skin incision clean, dry intact. Steris in place. No surrounding edema or erythema.        PAST MEDICAL & SURGICAL HISTORY:  Asthma      No significant past surgical history          Diet:    Labs:                        8.2    15.50 )-----------( 331      ( 11 Jul 2025 11:21 )             26.8                         9.2    14.96 )-----------( 332      ( 09 Jul 2025 23:10 )             29.5

## 2025-07-13 NOTE — PROGRESS NOTE ADULT - ASSESSMENT
A/P: 20y yo now P1 s/p primary LTCS for arrest of labor, QBL 535cc, POD3, recovering well     - RVP negative  -ambulation encouraged  -PO hydration encouraged  -regular diet  -lovenox ordered for DVT prophylaxis  -Incentive Spirometry encouraged  -pain management per routine  -anticipate d/c home is today  - return precautions discussed   -instructed to follow up in 1 week for incision check, and 6 weeks for postpartum check  A/P: 19yo now P1 s/p primary LTCS for arrest of labor, QBL 535cc, POD3, recovering well     - RVP negative  -ambulation encouraged  -PO hydration encouraged  -regular diet  -lovenox ordered for DVT prophylaxis  -Incentive Spirometry encouraged  -pain management per routine  -anticipate d/c home is today  - return precautions discussed   -instructed to follow up in 1 week for incision check, and 6 weeks for postpartum check

## 2025-07-14 ENCOUNTER — TRANSCRIPTION ENCOUNTER (OUTPATIENT)
Age: 20
End: 2025-07-14

## 2025-07-14 VITALS
OXYGEN SATURATION: 97 % | DIASTOLIC BLOOD PRESSURE: 79 MMHG | SYSTOLIC BLOOD PRESSURE: 122 MMHG | TEMPERATURE: 98 F | RESPIRATION RATE: 18 BRPM | HEART RATE: 78 BPM

## 2025-07-14 PROCEDURE — 71046 X-RAY EXAM CHEST 2 VIEWS: CPT | Mod: 26

## 2025-07-14 PROCEDURE — 93010 ELECTROCARDIOGRAM REPORT: CPT

## 2025-07-14 RX ORDER — SIMETHICONE 80 MG
1 TABLET,CHEWABLE ORAL
Qty: 30 | Refills: 0
Start: 2025-07-14

## 2025-07-14 RX ORDER — IBUPROFEN 200 MG
1 TABLET ORAL
Qty: 30 | Refills: 0
Start: 2025-07-14

## 2025-07-14 RX ORDER — ACETAMINOPHEN 500 MG/5ML
3 LIQUID (ML) ORAL
Qty: 60 | Refills: 0
Start: 2025-07-14

## 2025-07-14 RX ADMIN — Medication 975 MILLIGRAM(S): at 21:13

## 2025-07-14 RX ADMIN — Medication 975 MILLIGRAM(S): at 16:02

## 2025-07-14 RX ADMIN — Medication 600 MILLIGRAM(S): at 01:13

## 2025-07-14 RX ADMIN — Medication 600 MILLIGRAM(S): at 07:08

## 2025-07-14 RX ADMIN — DEXTROMETHORPHAN HBR, GUAIFENESIN 100 MILLIGRAM(S): 200 LIQUID ORAL at 09:56

## 2025-07-14 RX ADMIN — Medication 600 MILLIGRAM(S): at 13:04

## 2025-07-14 RX ADMIN — Medication 600 MILLIGRAM(S): at 00:43

## 2025-07-14 RX ADMIN — Medication 600 MILLIGRAM(S): at 06:38

## 2025-07-14 RX ADMIN — ENOXAPARIN SODIUM 40 MILLIGRAM(S): 100 INJECTION SUBCUTANEOUS at 13:03

## 2025-07-14 RX ADMIN — Medication 975 MILLIGRAM(S): at 09:57

## 2025-07-14 NOTE — PROGRESS NOTE ADULT - ATTENDING COMMENTS
21yo now P1, POD3 s/p primary LTCS for arrest of labor, QBL 535cc c/b acute on chronic blood loss anemia s/p venofer , GDMA2, asthma  Patient meeting post-op milestones with labs and VS stable for discharge.   Return precautions given.   DC documentation provided
19yo now P1 S/P primary C/S for arrest of dilation under general anesthesia, POD 2, QBL 535cc, GDMA2, h/o asthma, recovering well   - pain management with PO pain meds   - simethicone  - SCDs, lovenox ordered for DVT prophylaxis   - voiding  - Hgb 9.2->8.2, venofer ordered  - routine postop care
19yo now P1, POD3 s/p primary LTCS for arrest of labor, QBL 535cc c/b acute on chronic blood loss anemia s/p venofer , GDMA2, asthma  Patient meeting post-op milestones with labs and VS stable for discharge.   Return precautions given.   DC documentation provided

## 2025-07-14 NOTE — DISCHARGE NOTE OB - CARE PLAN
Principal Discharge DX:	S/P  section  Assessment and plan of treatment:	PAIN MANAGEMENT:   Alternate Acetaminophen/Tylenol and Ibuprofen/Motrin (if you are eligible). Each of these medications can be taken every six hours. Try to stagger them so that you are taking something for pain every three hours (ex. Take Motrin at 12:00, Tylenol at 3:00, Motrin at 6:00, etc.) to maximize pain relief.  o Dosages       - Tylenol – 500-650 mg every 6 hours as needed       - Motrin/Ibuprofen - 600 mg every 6 hours as needed  o The maximum dose of Tylenol is 3000 mg in 24 hours, the maximum dose of Motrin/ibuprofen is 2400mg in 24 hours  A warm shower or heating pad may also help.    WOUND CARE  Keep incisions clean and dry. No heavy lifting x4 weeks. Nothing in the vagina for 6 weeks - no sex, tampons, douching, tub baths or pools. May Shower. If you have a fever over 100.4F, severe pain or severe bleeding, please call your doctor or visit the emergency room.     FOLLOW UP in 1 week for incision check and 6 weeks for postpartum check with your doctor.   1

## 2025-07-14 NOTE — DISCHARGE NOTE OB - PATIENT PORTAL LINK FT
You can access the FollowMyHealth Patient Portal offered by [facility] by registering at the following website: [email or web address]. By joining [employer]’s FollowMyHealth portal, you will also be able to view your health information using other applications (apps) compatible with our system.

## 2025-07-14 NOTE — PROGRESS NOTE ADULT - SUBJECTIVE AND OBJECTIVE BOX
CEE GUSTAFSON  20y  Female    PGY2 Note:  Patient seen and examined bedside. No overnight events, no complaints, no acute events overnight. Denies heavy vaginal bleeding. Pain well controlled. Ambulating without difficulty. Tolerating diet, voiding, passing flatus, no BM. Not breastfeeding.     Physical Exam  Vital Signs Last 24 Hrs  T(C): 36.7 (07-13-25 @ 23:24), Max: 36.7 (07-13-25 @ 23:24)  T(F): 98 (07-13-25 @ 23:24), Max: 98 (07-13-25 @ 23:24)  HR: 92 (07-13-25 @ 23:24) (72 - 99)  BP: 115/73 (07-13-25 @ 23:24) (112/70 - 124/80)  RR: 19 (07-13-25 @ 23:24) (18 - 19)  SpO2: 98% (07-13-25 @ 23:24) (96% - 98%)    Parameters below as of 13 Jul 2025 07:58  Patient On (Oxygen Delivery Method): room air        Gen: NAD, sitting comfortably  CV: RRR. No murmurs gallops or rubs.  Pulm: CTAB. No wheezes or rales.  Ext: No calf tenderness, no swelling.   Abd: Nondistended, soft, nontender, BS+, fundus firm, and below umbilicus.   Lochia: Minimal rubra  Wound: Pfannenstiel skin incision clean, dry intact. Steris in place. No surrounding edema or erythema.        PAST MEDICAL & SURGICAL HISTORY:  Asthma      No significant past surgical history      Labs:                        8.2    15.50 )-----------( 331      ( 11 Jul 2025 11:21 )             26.8                         9.2    14.96 )-----------( 332      ( 09 Jul 2025 23:10 )             29.5

## 2025-07-14 NOTE — DISCHARGE NOTE OB - CARE PROVIDER_API CALL
Ralf Evangelista  Obstetrics & Gynecology  [address] [place] [address]  Phone: [phone]  Fax: [phone]  Follow Up Time:

## 2025-07-14 NOTE — DISCHARGE NOTE OB - HOSPITAL COURSE
Patient underwent an primary LTCS for arrest of dilation c/b conversion to general anesthesia given poor pain control. Patient’s postoperative course was unremarkable and she remained hemodynamically stable and afebrile throughout. Upon discharge, the patient is ambulating and voiding spontaneously, tolerating oral intake, pain was well controlled with oral medication, and vital signs were stable.

## 2025-07-14 NOTE — PROGRESS NOTE ADULT - ASSESSMENT
A/P: 21yo now P1 s/p primary LTCS for arrest of labor, QBL 535cc, POD4, recovering well     - RVP negative  -ambulation encouraged  -PO hydration encouraged  -regular diet  -lovenox ordered for DVT prophylaxis  -Incentive Spirometry encouraged  -pain management per routine  -anticipate d/c home is today  - return precautions discussed   -instructed to follow up in 1 week for incision check, and 6 weeks for postpartum check

## 2025-07-14 NOTE — DISCHARGE NOTE OB - FINANCIAL ASSISTANCE
[facility] provides services at a reduced cost to those who are determined to be eligible through [facility]’s financial assistance program. Information regarding [facility]’s financial assistance program can be found by going to [email or web address] or by calling [phone].

## 2025-07-14 NOTE — DISCHARGE NOTE OB - MEDICATION SUMMARY - MEDICATIONS TO STOP TAKING
I will STOP taking the medications listed below when I get home from the hospital:    metFORMIN 1000 mg oral tablet  -- 1 tab(s) by mouth every 12 hours

## 2025-07-15 ENCOUNTER — APPOINTMENT (OUTPATIENT)
Dept: ANTEPARTUM | Facility: CLINIC | Age: 20
End: 2025-07-15

## 2025-07-16 DIAGNOSIS — O99.513 DISEASES OF THE RESPIRATORY SYSTEM COMPLICATING PREGNANCY, THIRD TRIMESTER: ICD-10-CM

## 2025-07-16 DIAGNOSIS — F12.90 CANNABIS USE, UNSPECIFIED, UNCOMPLICATED: ICD-10-CM

## 2025-07-16 DIAGNOSIS — O35.EXX0 MATERNAL CARE FOR OTHER (SUSPECTED) FETAL ABNORMALITY AND DAMAGE, FETAL GENITOURINARY ANOMALIES, NOT APPLICABLE OR UNSPECIFIED: ICD-10-CM

## 2025-07-16 DIAGNOSIS — O36.63X0 MATERNAL CARE FOR EXCESSIVE FETAL GROWTH, THIRD TRIMESTER, NOT APPLICABLE OR UNSPECIFIED: ICD-10-CM

## 2025-07-16 DIAGNOSIS — R79.89 OTHER SPECIFIED ABNORMAL FINDINGS OF BLOOD CHEMISTRY: ICD-10-CM

## 2025-07-16 DIAGNOSIS — Z88.0 ALLERGY STATUS TO PENICILLIN: ICD-10-CM

## 2025-07-16 DIAGNOSIS — J45.909 UNSPECIFIED ASTHMA, UNCOMPLICATED: ICD-10-CM

## 2025-07-16 DIAGNOSIS — Z91.199 PATIENT'S NONCOMPLIANCE WITH OTHER MEDICAL TREATMENT AND REGIMEN DUE TO UNSPECIFIED REASON: ICD-10-CM

## 2025-07-16 DIAGNOSIS — O99.323 DRUG USE COMPLICATING PREGNANCY, THIRD TRIMESTER: ICD-10-CM

## 2025-07-16 DIAGNOSIS — O99.891 OTHER SPECIFIED DISEASES AND CONDITIONS COMPLICATING PREGNANCY: ICD-10-CM

## 2025-07-16 DIAGNOSIS — O24.415 GESTATIONAL DIABETES MELLITUS IN PREGNANCY, CONTROLLED BY ORAL HYPOGLYCEMIC DRUGS: ICD-10-CM

## 2025-07-16 DIAGNOSIS — Z3A.36 36 WEEKS GESTATION OF PREGNANCY: ICD-10-CM

## 2025-07-16 LAB — SURGICAL PATHOLOGY STUDY: SIGNIFICANT CHANGE UP

## 2025-07-17 ENCOUNTER — APPOINTMENT (OUTPATIENT)
Dept: OBGYN | Facility: CLINIC | Age: 20
End: 2025-07-17

## 2025-07-17 PROBLEM — R10.2 PELVIC PAIN: Status: ACTIVE | Noted: 2025-07-17

## 2025-07-17 PROCEDURE — 0503F POSTPARTUM CARE VISIT: CPT

## 2025-07-18 ENCOUNTER — APPOINTMENT (OUTPATIENT)
Dept: MATERNAL FETAL MEDICINE | Facility: CLINIC | Age: 20
End: 2025-07-18

## 2025-07-18 ENCOUNTER — APPOINTMENT (OUTPATIENT)
Dept: ANTEPARTUM | Facility: CLINIC | Age: 20
End: 2025-07-18
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
## 2025-07-20 DIAGNOSIS — T38.3X6A UNDERDOSING OF INSULIN AND ORAL HYPOGLYCEMIC [ANTIDIABETIC] DRUGS, INITIAL ENCOUNTER: ICD-10-CM

## 2025-07-20 DIAGNOSIS — O26.893 OTHER SPECIFIED PREGNANCY RELATED CONDITIONS, THIRD TRIMESTER: ICD-10-CM

## 2025-07-20 DIAGNOSIS — N83.8 OTHER NONINFLAMMATORY DISORDERS OF OVARY, FALLOPIAN TUBE AND BROAD LIGAMENT: ICD-10-CM

## 2025-07-20 DIAGNOSIS — Z59.41 FOOD INSECURITY: ICD-10-CM

## 2025-07-20 DIAGNOSIS — Z91.148 PATIENT'S OTHER NONCOMPLIANCE WITH MEDICATION REGIMEN FOR OTHER REASON: ICD-10-CM

## 2025-07-20 DIAGNOSIS — M54.30 SCIATICA, UNSPECIFIED SIDE: ICD-10-CM

## 2025-07-20 DIAGNOSIS — Z28.21 IMMUNIZATION NOT CARRIED OUT BECAUSE OF PATIENT REFUSAL: ICD-10-CM

## 2025-07-20 DIAGNOSIS — Z3A.36 36 WEEKS GESTATION OF PREGNANCY: ICD-10-CM

## 2025-07-20 DIAGNOSIS — Z88.0 ALLERGY STATUS TO PENICILLIN: ICD-10-CM

## 2025-07-20 DIAGNOSIS — O36.63X0 MATERNAL CARE FOR EXCESSIVE FETAL GROWTH, THIRD TRIMESTER, NOT APPLICABLE OR UNSPECIFIED: ICD-10-CM

## 2025-07-20 DIAGNOSIS — D62 ACUTE POSTHEMORRHAGIC ANEMIA: ICD-10-CM

## 2025-07-20 DIAGNOSIS — F12.90 CANNABIS USE, UNSPECIFIED, UNCOMPLICATED: ICD-10-CM

## 2025-07-20 DIAGNOSIS — O34.83 MATERNAL CARE FOR OTHER ABNORMALITIES OF PELVIC ORGANS, THIRD TRIMESTER: ICD-10-CM

## 2025-07-20 SDOH — ECONOMIC STABILITY - FOOD INSECURITY: FOOD INSECURITY: Z59.41

## 2025-07-22 ENCOUNTER — APPOINTMENT (OUTPATIENT)
Dept: ANTEPARTUM | Facility: CLINIC | Age: 20
End: 2025-07-22

## 2025-07-25 ENCOUNTER — APPOINTMENT (OUTPATIENT)
Dept: MATERNAL FETAL MEDICINE | Facility: CLINIC | Age: 20
End: 2025-07-25

## 2025-07-25 ENCOUNTER — APPOINTMENT (OUTPATIENT)
Dept: ANTEPARTUM | Facility: CLINIC | Age: 20
End: 2025-07-25

## 2025-07-30 ENCOUNTER — APPOINTMENT (OUTPATIENT)
Dept: OBGYN | Facility: CLINIC | Age: 20
End: 2025-07-30
Payer: MEDICAID

## 2025-07-30 PROCEDURE — 0503F POSTPARTUM CARE VISIT: CPT

## 2025-09-02 ENCOUNTER — APPOINTMENT (OUTPATIENT)
Dept: OBGYN | Facility: CLINIC | Age: 20
End: 2025-09-02